# Patient Record
Sex: FEMALE | Race: WHITE | NOT HISPANIC OR LATINO | Employment: FULL TIME | URBAN - METROPOLITAN AREA
[De-identification: names, ages, dates, MRNs, and addresses within clinical notes are randomized per-mention and may not be internally consistent; named-entity substitution may affect disease eponyms.]

---

## 2017-02-26 ENCOUNTER — HOSPITAL ENCOUNTER (EMERGENCY)
Facility: MEDICAL CENTER | Age: 47
End: 2017-02-26
Attending: EMERGENCY MEDICINE
Payer: MEDICAID

## 2017-02-26 VITALS
HEIGHT: 66 IN | DIASTOLIC BLOOD PRESSURE: 87 MMHG | RESPIRATION RATE: 16 BRPM | HEART RATE: 70 BPM | WEIGHT: 231.7 LBS | BODY MASS INDEX: 37.24 KG/M2 | OXYGEN SATURATION: 96 % | TEMPERATURE: 97.4 F | SYSTOLIC BLOOD PRESSURE: 149 MMHG

## 2017-02-26 DIAGNOSIS — Z76.0 MEDICATION REFILL: ICD-10-CM

## 2017-02-26 PROCEDURE — 99283 EMERGENCY DEPT VISIT LOW MDM: CPT

## 2017-02-26 RX ORDER — ATENOLOL 50 MG/1
50 TABLET ORAL DAILY
Qty: 30 TAB | Refills: 0 | Status: SHIPPED | OUTPATIENT
Start: 2017-02-26

## 2017-02-26 RX ORDER — CITALOPRAM 40 MG/1
40 TABLET ORAL DAILY
Qty: 30 TAB | Refills: 0 | Status: SHIPPED | OUTPATIENT
Start: 2017-02-26 | End: 2017-05-22

## 2017-02-26 RX ORDER — LISINOPRIL AND HYDROCHLOROTHIAZIDE 12.5; 1 MG/1; MG/1
1 TABLET ORAL DAILY
Qty: 30 TAB | Refills: 0 | Status: SHIPPED | OUTPATIENT
Start: 2017-02-26

## 2017-02-26 RX ORDER — ONDANSETRON 4 MG/1
4 TABLET, FILM COATED ORAL EVERY 4 HOURS PRN
Qty: 20 TAB | Refills: 0 | Status: SHIPPED | OUTPATIENT
Start: 2017-02-26 | End: 2017-04-02

## 2017-02-26 NOTE — ED AVS SNAPSHOT
2/26/2017          Jaqueline Morocho  [address]  [facility] [address]    Dear Jaqueline:    [facility] wants to ensure your discharge home is safe and you or your loved ones have had all your questions answered regarding your care after you leave the hospital.    You may receive a telephone call within two days of your discharge.  This call is to make certain you understand your discharge instructions as well as ensure we provided you with the best care possible during your stay with us.     The call will only last approximately 3-5 minutes and will be done by a nurse.    Once again, we want to ensure your discharge home is safe and that you have a clear understanding of any next steps in your care.  If you have any questions or concerns, please do not hesitate to contact us, we are here for you.  Thank you for choosing [facility] for your healthcare needs.    Sincerely,    Dion Hutson    [facility]

## 2017-02-26 NOTE — ED AVS SNAPSHOT
[employer] Access Code: Activation code not generated  Current [employer] Status: Active    [email or web address]  A secure, online tool to manage your health information     [employer]’s [employer]® is a secure, online tool that connects you to your personalized health information from the privacy of your home -- day or night - making it very easy for you to manage your healthcare. Once the activation process is completed, you can even access your medical information using the [employer] victoriano, which is available for free in the Apple Victoriano store or Google Play store.     [employer] provides the following levels of access (as shown below):   My Chart Features   [facility] Primary Care Doctor [facility]  Specialists [facility]  Urgent  Care Non-[facility]  Primary Care  Doctor   Email your healthcare team securely and privately 24/7 X X X X   Manage appointments: schedule your next appointment; view details of past/upcoming appointments X      Request prescription refills. X      View recent personal medical records, including lab and immunizations X X X X   View health record, including health history, allergies, medications X X X X   Read reports about your outpatient visits, procedures, consult and ER notes X X X X   See your discharge summary, which is a recap of your hospital and/or ER visit that includes your diagnosis, lab results, and care plan. X X       How to register for [employer]:  1. Go to  [email or web address].  2. Click on the Sign Up Now box, which takes you to the New Member Sign Up page. You will need to provide the following information:  a. Enter your [employer] Access Code exactly as it appears at the top of this page. (You will not need to use this code after you’ve completed the sign-up process. If you do not sign up before the expiration date, you must request a new code.)   b. Enter your date of birth.   c. Enter your home email address.   d. Click Submit, and follow the next screen’s instructions.  3. Create a [employer] ID. This will  be your [employer] login ID and cannot be changed, so think of one that is secure and easy to remember.  4. Create a [employer] password. You can change your password at any time.  5. Enter your Password Reset Question and Answer. This can be used at a later time if you forget your password.   6. Enter your e-mail address. This allows you to receive e-mail notifications when new information is available in [employer].  7. Click Sign Up. You can now view your health information.    For assistance activating your [employer] account, call [phone]

## 2017-02-26 NOTE — ED AVS SNAPSHOT
Home Care Instructions                                                                                                                Jaqueline Morocho   MRN: [id number]    Department:  [facility], Emergency Dept   Date of Visit:  2/26/2017            [facility], Emergency Dept    [address]    [place] [address]    Phone:  [phone]      You were seen by     Wendy Snow M.D.      Your Diagnosis Was     Medication refill     Z76.0       Follow-up Information     1. Follow up with primary care.    Why:  keep appointment to establish care in 2.5 weeks      Medication Information     Review all of your home medications and newly ordered medications with your primary doctor and/or pharmacist as soon as possible. Follow medication instructions as directed by your doctor and/or pharmacist.     Please keep your complete medication list with you and share with your physician. Update the information when medications are discontinued, doses are changed, or new medications (including over-the-counter products) are added; and carry medication information at all times in the event of emergency situations.               Medication List      START taking these medications        Instructions    atenolol 50 MG Tabs   Commonly known as:  TENORMIN    Take 1 Tab by mouth every day.   Dose:  50 mg       citalopram 40 MG Tabs   Commonly known as:  CELEXA    Take 1 Tab by mouth every day.   Dose:  40 mg       lisinopril-hydrochlorothiazide 10-12.5 MG per tablet   Commonly known as:  PRINZIDE, ZESTORETIC    Take 1 Tab by mouth every day.   Dose:  1 Tab       ondansetron 4 MG Tabs tablet   Commonly known as:  ZOFRAN    Take 1 Tab by mouth every four hours as needed for Nausea/Vomiting.   Dose:  4 mg         ASK your doctor about these medications        Instructions    amitriptyline 25 MG Tabs   Commonly known as:  ELAVIL    Take 1 Tab by mouth at bedtime as needed.   Dose:  25 mg       carisoprodol 350 MG Tabs   Commonly known as:  SOMA    Take 1 Tab by mouth every 8 hours as needed (pain). No driving, no drinking alcohol   Dose:  350 mg       * cyclobenzaprine 10 MG Tabs   Commonly known as:  FLEXERIL    Take 1 Tab by mouth 3 times a day as needed.   Dose:  10 mg       * cyclobenzaprine 10 MG Tabs   Commonly known as:  FLEXERIL    Take 1 Tab by mouth 3 times a day as needed.   Dose:  10 mg       diclofenac EC 75 MG Tbec   Commonly known as:  VOLTAREN    Take 1 Tab by mouth 2 times a day.   Dose:  75 mg       ibuprofen 200 MG Tabs   Commonly known as:  MOTRIN    Take 200 mg by mouth every 6 hours as needed.   Dose:  200 mg       MethylPREDNISolone 4 MG Tbpk   Commonly known as:  MEDROL DOSEPAK    As directed       oxycodone-acetaminophen 5-325 MG Tabs   Commonly known as:  PERCOCET    Take 1-2 Tabs by mouth every four hours as needed.   Dose:  1-2 Tab       * Notice:  This list has 2 medication(s) that are the same as other medications prescribed for you. Read the directions carefully, and ask your doctor or other care provider to review them with you.              Discharge Instructions       Medicine Refill at the Emergency Department  We have refilled your medicine today, but it is best for you to get refills through your primary health care provider's office. In the future, please plan ahead so you do not need to get refills from the emergency department.  If the medicine we refilled was a maintenance medicine, you may have received only enough to get you by until you are able to see your regular health care provider.     This information is not intended to replace advice given to you by your health care provider. Make sure you discuss any questions you have with your health care provider.     Document Released: 04/05/2005 Document Revised: 01/08/2016 Document Reviewed: 03/27/2015  [employer] Interactive Patient Education ©2016 Elsevier Inc.            Patient Information     Patient  Information    Following emergency treatment: all patient requiring follow-up care must return either to a private physician or a clinic if your condition worsens before you are able to obtain further medical attention, please return to the emergency room.     Billing Information    At [facility], we work to make the billing process streamlined for our patients.  Our Representatives are here to answer any questions you may have regarding your hospital bill.  If you have insurance coverage and have supplied your insurance information to us, we will submit a claim to your insurer on your behalf.  Should you have any questions regarding your bill, we can be reached online or by phone as follows:  Online: You are able pay your bills online or live chat with our representatives about any billing questions you may have. We are here to help Monday - Friday from 8:00am to 7:30pm and 9:00am - 12:00pm on Saturdays.  Please visit [email or web address]  for more information.   Phone:  [phone] or [phone]    Please note that your emergency physician, surgeon, pathologist, radiologist, anesthesiologist, and other specialists are not employed by [facility] and will therefore bill separately for their services.  Please contact them directly for any questions concerning their bills at the numbers below:     Emergency Physician Services:  [phone]  [facility]:  [phone]  [facility]:  [phone]  [facility]:  [phone]    1. Your final bill may vary from the amount quoted upon discharge if all procedures are not complete at that time, or if your doctor has additional procedures of which we are not aware. You will receive an additional bill if you return to the Emergency Department at [facility] for suture removal regardless of the facility of which the sutures were placed.     2. Please arrange for settlement of this account  at the emergency registration.    3. All self-pay accounts are due in full at the time of treatment.  If you are unable to meet this obligation then payment is expected within 4-5 days.     4. If you have had radiology studies (CT, X-ray, Ultrasound, MRI), you have received a preliminary result during your emergency department visit. Please contact the radiology department [phone] to receive a copy of your final result. Please discuss the Final result with your primary physician or with the follow up physician provided.     Crisis Hotline:  [place] Crisis Hotline:  9-287-LVVNOXX or 1-657.296.1253  [place] Crisis Hotline:    1-454.494.6421 or 142-742-1953         ED Discharge Follow Up Questions    1. In order to provide you with very good care, we would like to follow up with a phone call in the next few days.  May we have your permission to contact you?     YES /  NO    2. What is the best phone number to call you? (       )_____-__________    3. What is the best time to call you?      Morning  /  Afternoon  /  Evening                   Patient Signature:  ____________________________________________________________    Date:  ____________________________________________________________

## 2017-02-27 NOTE — DISCHARGE INSTRUCTIONS
Medicine Refill at the Emergency Department  We have refilled your medicine today, but it is best for you to get refills through your primary health care provider's office. In the future, please plan ahead so you do not need to get refills from the emergency department.  If the medicine we refilled was a maintenance medicine, you may have received only enough to get you by until you are able to see your regular health care provider.     This information is not intended to replace advice given to you by your health care provider. Make sure you discuss any questions you have with your health care provider.     Document Released: 04/05/2005 Document Revised: 01/08/2016 Document Reviewed: 03/27/2015  Elsevier Interactive Patient Education ©2016 Elsevier Inc.

## 2017-02-27 NOTE — ED NOTES
Pt comes in asking for medication refill on celexa, atenolol 50mg, and lisinopril/HCTZ. Pt states she has an appt in 2.5 weeks but has been out of her meds for 1 week.

## 2017-02-27 NOTE — ED NOTES
Pt given d/c instructions/prescription/home care instructions, given 4 Rx, pt verbalized understanding of POC, pt ambulated to ER lobby, steady gait.

## 2017-02-27 NOTE — ED PROVIDER NOTES
"ED Provider Note    Scribed for Wendy Snow M.D. by Usha Tomlin. 2/26/2017  6:55 PM    Primary care provider: Pcp Pt States None  Means of arrival: walk-in  History obtained from: patient  History limited by: none    CHIEF COMPLAINT  Chief Complaint   Patient presents with   • Medication Refill       HPI  Jaqueline Morocho is a 46 y.o. female who presents to the Emergency Department for medication refills. Patient has been out of her daily medications 50mg Atenolol once a day, 12.5 Lisinopril/HCTZ once a day, and Celeza for a few weeks now. She has an appointment to follow up with her Primary Care in 2.5 weeks, however was unable to get in any sooner appointment. Her only complaint at this time is nausea and vomiting which she requests Zofran for. No complaints of fever, sore throat, cough, or congestion.    REVIEW OF SYSTEMS  HEENT:  No, congestion, or sore throat    PULMONARY: no cough, or congestion   GI: positive nausea and vomiting  : no dysuria, back pain, or hematuria   Endocrine: no fevers     PAST MEDICAL HISTORY   has a past medical history of Psychiatric disorder and Hypertension.    SURGICAL HISTORY  patient denies any surgical history    SOCIAL HISTORY  Social History   Substance Use Topics   • Smoking status: Never Smoker    • Smokeless tobacco: None   • Alcohol Use: No      History   Drug Use No       FAMILY HISTORY  History reviewed. No pertinent family history.    CURRENT MEDICATIONS  Home Medications     **Home medications have not yet been reviewed for this encounter**          ALLERGIES  Allergies   Allergen Reactions   • Augmentin    • Bactrim [Sulfamethoxazole W-Trimethoprim]    • Cephalosporins    • Demerol    • Fentanyl    • Morphine    • Sulfa Drugs        PHYSICAL EXAM  VITAL SIGNS: /87 mmHg  Pulse 70  Temp(Src) 36.3 °C (97.4 °F) (Temporal)  Resp 16  Ht 1.676 m (5' 5.98\")  Wt 105.1 kg (231 lb 11.3 oz)  BMI 37.42 kg/m2  SpO2 96%    Constitutional: Well developed, Well " nourished, No acute distress, Non-toxic appearance.   HEENT: Normocephalic, Atraumatic,  external ears normal,   Eyes: PERRL, EOMI, Conjunctiva normal, No discharge.   Neck: Normal range of motion,.     Cardiovascular: Regular Rate and Rhythm, No murmurs,  rubs, or gallops.   Thorax & Lungs: Lungs clear to auscultation bilaterally, No respiratory distress, No wheezes, rhales or rhonchi, No chest wall tenderness.   Skin: Warm, Dry, No erythema, No rash,   Neurologic: Alert & oriented x 3, Normal motor function, Normal sensory function, No focal deficits noted. Normal reflexes. Normal Cranial Nerves.   Musculoskeletal: Good range of motion in all major joints. No major deformities noted.       COURSE & MEDICAL DECISION MAKING  Nursing notes, VS, PMSFHx reviewed in chart.    6:55 PM - Patient seen and examined at bedside. I informed the patient that I would be able to refill her prescriptions for a month and to keep her PCP appointment. She will be discharged    I reviewed prescription monitoring program for patient's narcotic use before prescribing a scheduled drug.The patient will not drink alcohol nor drive with prescribed medications. The patient will return for new or worsening symptoms and is stable at the time of discharge.      DISPOSITION:  Patient will be discharged home in stable condition.    FOLLOW UP:  primary care      keep appointment to establish care in 2.5 weeks      OUTPATIENT MEDICATIONS:  Discharge Medication List as of 2/26/2017  7:03 PM      START taking these medications    Details   citalopram (CELEXA) 40 MG Tab Take 1 Tab by mouth every day., Disp-30 Tab, R-0, Print Rx Paper      atenolol (TENORMIN) 50 MG Tab Take 1 Tab by mouth every day., Disp-30 Tab, R-0, Print Rx Paper      lisinopril-hydrochlorothiazide (PRINZIDE, ZESTORETIC) 10-12.5 MG per tablet Take 1 Tab by mouth every day., Disp-30 Tab, R-0, Print Rx Paper      ondansetron (ZOFRAN) 4 MG Tab tablet 4 mg, EVERY 4 HOURS PRN Starting  2/26/2017, Until Discontinued, Disp-20 Tab, R-0, Oral                 FINAL IMPRESSION  1. Medication refill          I, Usha Tomlin (Scribe), am scribing for, and in the presence of, Wendy Snow M.D..    Electronically signed by: Usha Tomlin (Scribe), 2/26/2017    IWendy M.D. personally performed the services described in this documentation, as scribed by Usha Tomlin in my presence, and it is both accurate and complete.    The note accurately reflects work and decisions made by me.  Wendy Snow  2/26/2017  7:55 PM

## 2017-04-02 ENCOUNTER — APPOINTMENT (OUTPATIENT)
Dept: RADIOLOGY | Facility: MEDICAL CENTER | Age: 47
End: 2017-04-02
Attending: EMERGENCY MEDICINE
Payer: MEDICAID

## 2017-04-02 ENCOUNTER — HOSPITAL ENCOUNTER (EMERGENCY)
Facility: MEDICAL CENTER | Age: 47
End: 2017-04-02
Attending: EMERGENCY MEDICINE
Payer: MEDICAID

## 2017-04-02 VITALS
DIASTOLIC BLOOD PRESSURE: 93 MMHG | HEIGHT: 66 IN | RESPIRATION RATE: 14 BRPM | BODY MASS INDEX: 33.75 KG/M2 | HEART RATE: 65 BPM | OXYGEN SATURATION: 98 % | TEMPERATURE: 97 F | SYSTOLIC BLOOD PRESSURE: 133 MMHG | WEIGHT: 210 LBS

## 2017-04-02 DIAGNOSIS — M25.531 WRIST PAIN, RIGHT: ICD-10-CM

## 2017-04-02 DIAGNOSIS — Z76.0 MEDICATION REFILL: ICD-10-CM

## 2017-04-02 PROCEDURE — 73110 X-RAY EXAM OF WRIST: CPT | Mod: RT

## 2017-04-02 PROCEDURE — 29125 APPL SHORT ARM SPLINT STATIC: CPT

## 2017-04-02 PROCEDURE — 302874 HCHG BANDAGE ACE 2 OR 3""

## 2017-04-02 PROCEDURE — 99284 EMERGENCY DEPT VISIT MOD MDM: CPT

## 2017-04-02 PROCEDURE — 700102 HCHG RX REV CODE 250 W/ 637 OVERRIDE(OP): Performed by: EMERGENCY MEDICINE

## 2017-04-02 PROCEDURE — A9270 NON-COVERED ITEM OR SERVICE: HCPCS | Performed by: EMERGENCY MEDICINE

## 2017-04-02 RX ORDER — HYDROCODONE BITARTRATE AND ACETAMINOPHEN 5; 325 MG/1; MG/1
1-2 TABLET ORAL EVERY 6 HOURS PRN
Qty: 10 TAB | Refills: 0 | Status: SHIPPED | OUTPATIENT
Start: 2017-04-02 | End: 2017-08-10

## 2017-04-02 RX ORDER — ATENOLOL 50 MG/1
50 TABLET ORAL DAILY
Qty: 30 TAB | Refills: 0 | Status: SHIPPED | OUTPATIENT
Start: 2017-04-02

## 2017-04-02 RX ORDER — HYDROCODONE BITARTRATE AND ACETAMINOPHEN 5; 325 MG/1; MG/1
1 TABLET ORAL ONCE
Status: COMPLETED | OUTPATIENT
Start: 2017-04-02 | End: 2017-04-02

## 2017-04-02 RX ORDER — LISINOPRIL AND HYDROCHLOROTHIAZIDE 12.5; 1 MG/1; MG/1
1 TABLET ORAL DAILY
Qty: 30 TAB | Refills: 0 | Status: SHIPPED | OUTPATIENT
Start: 2017-04-02 | End: 2017-05-22

## 2017-04-02 RX ORDER — CITALOPRAM 40 MG/1
40 TABLET ORAL DAILY
Qty: 30 TAB | Refills: 0 | Status: SHIPPED | OUTPATIENT
Start: 2017-04-02

## 2017-04-02 RX ADMIN — HYDROCODONE BITARTRATE AND ACETAMINOPHEN 1 TABLET: 5; 325 TABLET ORAL at 17:09

## 2017-04-02 ASSESSMENT — PAIN SCALES - GENERAL
PAINLEVEL_OUTOF10: 6
PAINLEVEL_OUTOF10: 7
PAINLEVEL_OUTOF10: 5

## 2017-04-02 NOTE — ED AVS SNAPSHOT
Home Care Instructions                                                                                                                Jaqueline Morocho   MRN: [id number]    Department:  [facility], Emergency Dept   Date of Visit:  4/2/2017            [facility], Emergency Dept    [address]    [place] [address]    Phone:  [phone]      You were seen by     Vinicius Carlton M.D.      Your Diagnosis Was     Wrist pain, right     M25.531       These are the medications you received during your hospitalization from 04/02/2017 1503 to 04/02/2017 1754     Date/Time Order Dose Route Action    04/02/2017 1709 hydrocodone-acetaminophen (NORCO) 5-325 MG per tablet 1 Tab 1 Tab Oral Given      Follow-up Information     1. Follow up with [facility].    Why:  as scheduled    Contact information    [place] [address] [phone]          2. Follow up with Stewart Pena M.D.. Call in 1 day.    Specialty:  Family Medicine    Contact information    [facility] [id number]  [place] [address] [phone]        Medication Information     Review all of your home medications and newly ordered medications with your primary doctor and/or pharmacist as soon as possible. Follow medication instructions as directed by your doctor and/or pharmacist.     Please keep your complete medication list with you and share with your physician. Update the information when medications are discontinued, doses are changed, or new medications (including over-the-counter products) are added; and carry medication information at all times in the event of emergency situations.               Medication List      START taking these medications        Instructions    Morning Afternoon Evening Bedtime    hydrocodone-acetaminophen 5-325 MG Tabs per tablet   Last time this was given:  1 Tab on 4/2/2017  5:09 PM   Commonly known as:  NORCO        Take 1-2 Tabs by mouth every 6 hours as needed.   Dose:  1-2 Tab                         ASK your doctor about these medications        Instructions    Morning Afternoon Evening Bedtime    * atenolol 50 MG Tabs   What changed:  Another medication with the same name was added. Make sure you understand how and when to take each.   Commonly known as:  TENORMIN   Ask about: Which instructions should I use?        Take 1 Tab by mouth every day.   Dose:  50 mg                        * atenolol 50 MG Tabs   What changed:  You were already taking a medication with the same name, and this prescription was added. Make sure you understand how and when to take each.   Commonly known as:  TENORMIN   Ask about: Which instructions should I use?        Take 1 Tab by mouth every day.   Dose:  50 mg                        * citalopram 40 MG Tabs   What changed:  Another medication with the same name was added. Make sure you understand how and when to take each.   Commonly known as:  CELEXA   Ask about: Which instructions should I use?        Take 1 Tab by mouth every day.   Dose:  40 mg                        * citalopram 40 MG Tabs   What changed:  You were already taking a medication with the same name, and this prescription was added. Make sure you understand how and when to take each.   Commonly known as:  CELEXA   Ask about: Which instructions should I use?        Take 1 Tab by mouth every day.   Dose:  40 mg                        ibuprofen 200 MG Tabs   Commonly known as:  MOTRIN        Take 200 mg by mouth every 6 hours as needed.   Dose:  200 mg                        * lisinopril-hydrochlorothiazide 10-12.5 MG per tablet   What changed:  Another medication with the same name was added. Make sure you understand how and when to take each.   Commonly known as:  LEÓN ERIC   Ask about: Which instructions should I use?        Take 1 Tab by mouth every day.   Dose:  1 Tab                        * lisinopril-hydrochlorothiazide 10-12.5 MG per tablet   What changed:  You were already taking a  medication with the same name, and this prescription was added. Make sure you understand how and when to take each.   Commonly known as:  LEÓN ERIC   Ask about: Which instructions should I use?        Take 1 Tab by mouth every day.   Dose:  1 Tab                        * Notice:  This list has 6 medication(s) that are the same as other medications prescribed for you. Read the directions carefully, and ask your doctor or other care provider to review them with you.         Where to Get Your Medications      You can get these medications from any pharmacy     Bring a paper prescription for each of these medications    - atenolol 50 MG Tabs  - citalopram 40 MG Tabs  - hydrocodone-acetaminophen 5-325 MG Tabs per tablet  - lisinopril-hydrochlorothiazide 10-12.5 MG per tablet            Procedures and tests performed during your visit     DX-WRIST-COMPLETE 3+ RIGHT    SPLINT APPLICATION        Discharge Instructions       Wrist Pain  Wrist injuries are frequent in adults and children. A sprain is an injury to the ligaments that hold your bones together. A strain is an injury to muscle or muscle cord-like structures (tendons) from stretching or pulling. Generally, when wrists are moderately tender to touch following a fall or injury, a break in the bone (fracture) may be present. Most wrist sprains or strains are better in 3 to 5 days, but complete healing may take several weeks.  HOME CARE INSTRUCTIONS   · Put ice on the injured area.  ¨ Put ice in a plastic bag.  ¨ Place a towel between your skin and the bag.  ¨ Leave the ice on for 15-20 minutes, 3-4 times a day, for the first 2 days, or as directed by your health care provider.  · Keep your arm raised above the level of your heart whenever possible to reduce swelling and pain.  · Rest the injured area for at least 48 hours or as directed by your health care provider.  · If a splint or elastic bandage has been applied, use it for as long as directed by your  health care provider or until seen by a health care provider for a follow-up exam.  · Only take over-the-counter or prescription medicines for pain, discomfort, or fever as directed by your health care provider.  · Keep all follow-up appointments. You may need to follow up with a specialist or have follow-up X-rays. Improvement in pain level is not a guarantee that you did not fracture a bone in your wrist. The only way to determine whether or not you have a broken bone is by X-ray.  SEEK IMMEDIATE MEDICAL CARE IF:   · Your fingers are swollen, very red, white, or cold and blue.  · Your fingers are numb or tingling.  · You have increasing pain.  · You have difficulty moving your fingers.  MAKE SURE YOU:   · Understand these instructions.  · Will watch your condition.  · Will get help right away if you are not doing well or get worse.     This information is not intended to replace advice given to you by your health care provider. Make sure you discuss any questions you have with your health care provider.     Document Released: 09/27/2006 Document Revised: 12/23/2014 Document Reviewed: 02/08/2012  [employer] Interactive Patient Education ©2016 [employer]                  Patient Information     Patient Information    Following emergency treatment: all patient requiring follow-up care must return either to a private physician or a clinic if your condition worsens before you are able to obtain further medical attention, please return to the emergency room.     Billing Information    [facility] we work to make the billing process streamlined for our patients.  Our Representatives are here to answer any questions you may have regarding your hospital bill.  If you have insurance coverage and have supplied your insurance information to us, we will submit a claim to your insurer on your behalf.  Should you have any questions regarding your bill, we can be reached online or by phone as follows:  Online: You are able  pay your bills online or live chat with our representatives about any billing questions you may have. We are here to help Monday - Friday from 8:00am to 7:30pm and 9:00am - 12:00pm on Saturdays.  Please visit [email or web address]  for more information.   Phone:  [phone] or [phone]    Please note that your emergency physician, surgeon, pathologist, radiologist, anesthesiologist, and other specialists are not employed by [facility] and will therefore bill separately for their services.  Please contact them directly for any questions concerning their bills at the numbers below:     Emergency Physician Services:  [phone]  [facility]:  [phone]  [facility]:  [phone]  [facility]:  [phone]    1. Your final bill may vary from the amount quoted upon discharge if all procedures are not complete at that time, or if your doctor has additional procedures of which we are not aware. You will receive an additional bill if you return to the Emergency Department at [facility] for suture removal regardless of the facility of which the sutures were placed.     2. Please arrange for settlement of this account at the emergency registration.    3. All self-pay accounts are due in full at the time of treatment.  If you are unable to meet this obligation then payment is expected within 4-5 days.     4. If you have had radiology studies (CT, X-ray, Ultrasound, MRI), you have received a preliminary result during your emergency department visit. Please contact the radiology department [phone] to receive a copy of your final result. Please discuss the Final result with your primary physician or with the follow up physician provided.     Crisis Hotline:  [place] Crisis Hotline:  1-145-VERHTMP or 1-909.678.4930  [place] Crisis Hotline:    1-116.628.5927 or 766-483-5037         ED Discharge Follow Up Questions    1. In order to  provide you with very good care, we would like to follow up with a phone call in the next few days.  May we have your permission to contact you?     YES /  NO    2. What is the best phone number to call you? (       )_____-__________    3. What is the best time to call you?      Morning  /  Afternoon  /  Evening                   Patient Signature:  ____________________________________________________________    Date:  ____________________________________________________________

## 2017-04-02 NOTE — ED AVS SNAPSHOT
4/2/2017          Jaqueline Morocho  [address] [place] [id number]    Dear Jaqueline:    [facility] wants to ensure your discharge home is safe and you or your loved ones have had all your questions answered regarding your care after you leave the hospital.    You may receive a telephone call within two days of your discharge.  This call is to make certain you understand your discharge instructions as well as ensure we provided you with the best care possible during your stay with us.     The call will only last approximately 3-5 minutes and will be done by a nurse.    Once again, we want to ensure your discharge home is safe and that you have a clear understanding of any next steps in your care.  If you have any questions or concerns, please do not hesitate to contact us, we are here for you.  Thank you for choosing [facility] for your healthcare needs.    Sincerely,    Dion Hutson    [facility]

## 2017-04-02 NOTE — ED NOTES
Pt also states she needs a refill of her medications as she is unable to get into her pmd until may

## 2017-04-03 NOTE — DISCHARGE INSTRUCTIONS
Wrist Pain  Wrist injuries are frequent in adults and children. A sprain is an injury to the ligaments that hold your bones together. A strain is an injury to muscle or muscle cord-like structures (tendons) from stretching or pulling. Generally, when wrists are moderately tender to touch following a fall or injury, a break in the bone (fracture) may be present. Most wrist sprains or strains are better in 3 to 5 days, but complete healing may take several weeks.  HOME CARE INSTRUCTIONS   · Put ice on the injured area.  ¨ Put ice in a plastic bag.  ¨ Place a towel between your skin and the bag.  ¨ Leave the ice on for 15-20 minutes, 3-4 times a day, for the first 2 days, or as directed by your health care provider.  · Keep your arm raised above the level of your heart whenever possible to reduce swelling and pain.  · Rest the injured area for at least 48 hours or as directed by your health care provider.  · If a splint or elastic bandage has been applied, use it for as long as directed by your health care provider or until seen by a health care provider for a follow-up exam.  · Only take over-the-counter or prescription medicines for pain, discomfort, or fever as directed by your health care provider.  · Keep all follow-up appointments. You may need to follow up with a specialist or have follow-up X-rays. Improvement in pain level is not a guarantee that you did not fracture a bone in your wrist. The only way to determine whether or not you have a broken bone is by X-ray.  SEEK IMMEDIATE MEDICAL CARE IF:   · Your fingers are swollen, very red, white, or cold and blue.  · Your fingers are numb or tingling.  · You have increasing pain.  · You have difficulty moving your fingers.  MAKE SURE YOU:   · Understand these instructions.  · Will watch your condition.  · Will get help right away if you are not doing well or get worse.     This information is not intended to replace advice given to you by your health care  provider. Make sure you discuss any questions you have with your health care provider.     Document Released: 09/27/2006 Document Revised: 12/23/2014 Document Reviewed: 02/08/2012  [employer]vier Interactive Patient Education ©2016 [employer]

## 2017-04-03 NOTE — ED NOTES
Discharge instructions given to pt. Pt verbalized instructions and questions answered. 4 prescriptions given. Pt ambulatory to home with fiance.

## 2017-04-03 NOTE — ED PROVIDER NOTES
ED Provider Note    CHIEF COMPLAINT  Chief Complaint   Patient presents with   • Wrist Pain     right wrist injury 7 days ago. Has been seen at [facility]. C/o of increasing pain. Does sates swelling has decreased.    • Medication Refill     unable to get into pmd until may       [facility]  Jaqueline Morocho is a 46 y.o. female who presents with complaints of right wrist pain after fall 1 week ago. The patient says she fell going up some steps and landed on outstretched right hand and left elbow.  She was seen at [facility], and that she have x-rays which showed no fracture. He doesn't follow-up with orthopedics scheduled on April 10. She says she continues to have pain to the right wrist, worse when she tries to end her thumb backwards.  She also has pain with flexion and extension of the wrist. She denies any numbness or tingling to the hand. She denies any other injuries. She did not hit her head, no loss conscious. She denies any headache, neck pain, chest pain, back pain, or abdominal pain. She denies any significant pain to her left elbow. She is also asking for refill of her medications, says she has been out of them for about 1 week.    REVIEW OF SYSTEMS  See HPI for further details.      PAST MEDICAL HISTORY  Past Medical History   Diagnosis Date   • Psychiatric disorder      anxiety   • Hypertension        FAMILY HISTORY  No family history on file.    SOCIAL HISTORY  Social History     Social History   • Marital Status: Single     Spouse Name: N/A   • Number of Children: N/A   • Years of Education: N/A     Social History Main Topics   • Smoking status: Never Smoker    • Smokeless tobacco: None   • Alcohol Use: No   • Drug Use: No   • Sexual Activity: Not Asked     Other Topics Concern   • None     Social History Narrative       SURGICAL HISTORY  History reviewed. No pertinent past surgical history.    CURRENT MEDICATIONS  Home Medications     Reviewed by Chanel Whaley R.N. (Registered Nurse) on  "04/02/17 at 1543  Med List Status: Partial    Medication Last Dose Status    atenolol (TENORMIN) 50 MG Tab 4/2/2017 Active    citalopram (CELEXA) 40 MG Tab 4/2/2017 Active    ibuprofen (MOTRIN) 200 MG Tab 4/2/2017 Active    lisinopril-hydrochlorothiazide (PRINZIDE, ZESTORETIC) 10-12.5 MG per tablet 4/2/2017 Active                ALLERGIES  Allergies   Allergen Reactions   • Augmentin    • Bactrim [Sulfamethoxazole W-Trimethoprim]    • Cephalosporins    • Demerol    • Fentanyl    • Morphine    • Sulfa Drugs        PHYSICAL EXAM  VITAL SIGNS: /74 mmHg  Pulse 83  Temp(Src) 36.1 °C (97 °F)  Resp 16  Ht 1.676 m (5' 6\")  Wt 95.255 kg (210 lb)  BMI 33.91 kg/m2  SpO2 98%  Constitutional: Awake, alert, in no acute distress, Non-toxic appearance.   HENT: Atraumatic. Bilateral external ears normal, mucous membranes moist, throat nonerythematous without exudates, nose is normal.  Eyes: PERRL, EOMI, conjunctiva moist, noninjected.  Neck: Nontender, Normal range of motion, No nuchal rigidity, No stridor.   Lymphatic: No lymphadenopathy noted.   Cardiovascular: Regular rate and rhythm, no murmurs, rubs, gallops.  Thorax & Lungs:  Good breath sounds bilaterally, no wheezes, rales, or retractions.  No chest tenderness.  Abdomen: Bowel sounds normal, Soft, nontender, nondistended, no rebound, guarding, masses.  Back: No CVA or spinal tenderness.  Extremities: Intact distal pulses, No edema, there is tenderness over the dorsum and volar surface of the right wrist. There is tenderness over the anatomical snuffbox. There is increased pain with hyperextension of the left thumb, it radiates into her wrist and forearm. There is mild tenderness to the metacarpophalangeal joint of the thumb. No tenderness to the rest of the hand or fingers. There is good sensation and capillary refill to the fingers. There is a good radial pulse. There is no focal tenderness to the middle proximal forearm, elbow, or shoulder.  Skin: Warm, Dry, " No rashes.   Musculoskeletal: There is tenderness to the right wrist as noted above. No other joint swelling or tenderness.  Neurologic: Alert & oriented x 3, sensory and motor function normal. No focal deficits.   Psychiatric: Affect normal, Judgment normal, Mood normal.         RADIOLOGY/PROCEDURES  DX-WRIST-COMPLETE 3+ RIGHT   Final Result      No radiographic evidence of acute or subacute traumatic injury.            COURSE & MEDICAL DECISION MAKING  Pertinent Labs & Imaging studies reviewed. (See chart for details)  The patient presents with the above complaints. She is given a Norco for pain. X-rays of the right wrist was negative for fracture. Given that she has times over the anatomical snuffbox, she was placed in a thumb spica splint. Patient was given refills for her Celexa, atenolol, and lisinopril/HCTZ. She is also given Norco 5/325 #10. The patient is to return to the ER for worsening pain, redness, swelling, or any other problems. She is to follow-up with orthopedics as scheduled. She is to follow-up with her PCP and call the office tomorrow.    FINAL IMPRESSION  1. Right wrist injury  2. Medication refill  3.         Electronically signed by: Vinicius Carlton, 4/2/2017 5:33 PM

## 2017-04-10 ENCOUNTER — APPOINTMENT (OUTPATIENT)
Dept: RADIOLOGY | Facility: MEDICAL CENTER | Age: 47
End: 2017-04-10
Attending: EMERGENCY MEDICINE
Payer: MEDICAID

## 2017-04-10 ENCOUNTER — HOSPITAL ENCOUNTER (EMERGENCY)
Facility: MEDICAL CENTER | Age: 47
End: 2017-04-11
Attending: EMERGENCY MEDICINE
Payer: MEDICAID

## 2017-04-10 DIAGNOSIS — R11.2 NON-INTRACTABLE VOMITING WITH NAUSEA, UNSPECIFIED VOMITING TYPE: ICD-10-CM

## 2017-04-10 DIAGNOSIS — R10.84 GENERALIZED ABDOMINAL PAIN: ICD-10-CM

## 2017-04-10 LAB
ALBUMIN SERPL BCP-MCNC: 4.4 G/DL (ref 3.2–4.9)
ALBUMIN/GLOB SERPL: 1.4 G/DL
ALP SERPL-CCNC: 111 U/L (ref 30–99)
ALT SERPL-CCNC: 65 U/L (ref 2–50)
ANION GAP SERPL CALC-SCNC: 6 MMOL/L (ref 0–11.9)
APPEARANCE UR: CLEAR
AST SERPL-CCNC: 48 U/L (ref 12–45)
BASOPHILS # BLD AUTO: 0.5 % (ref 0–1.8)
BASOPHILS # BLD: 0.04 K/UL (ref 0–0.12)
BILIRUB SERPL-MCNC: 0.5 MG/DL (ref 0.1–1.5)
BUN SERPL-MCNC: 24 MG/DL (ref 8–22)
CALCIUM SERPL-MCNC: 9.4 MG/DL (ref 8.5–10.5)
CHLORIDE SERPL-SCNC: 102 MMOL/L (ref 96–112)
CO2 SERPL-SCNC: 25 MMOL/L (ref 20–33)
COLOR UR AUTO: YELLOW
CREAT SERPL-MCNC: 0.82 MG/DL (ref 0.5–1.4)
EOSINOPHIL # BLD AUTO: 0.2 K/UL (ref 0–0.51)
EOSINOPHIL NFR BLD: 2.7 % (ref 0–6.9)
ERYTHROCYTE [DISTWIDTH] IN BLOOD BY AUTOMATED COUNT: 46.5 FL (ref 35.9–50)
GFR SERPL CREATININE-BSD FRML MDRD: >60 ML/MIN/1.73 M 2
GLOBULIN SER CALC-MCNC: 3.1 G/DL (ref 1.9–3.5)
GLUCOSE SERPL-MCNC: 119 MG/DL (ref 65–99)
GLUCOSE UR QL STRIP.AUTO: NEGATIVE MG/DL
HCG UR QL: NEGATIVE
HCT VFR BLD AUTO: 44.4 % (ref 37–47)
HGB BLD-MCNC: 14.8 G/DL (ref 12–16)
IMM GRANULOCYTES # BLD AUTO: 0.03 K/UL (ref 0–0.11)
IMM GRANULOCYTES NFR BLD AUTO: 0.4 % (ref 0–0.9)
KETONES UR QL STRIP.AUTO: NEGATIVE MG/DL
LEUKOCYTE ESTERASE UR QL STRIP.AUTO: NEGATIVE
LIPASE SERPL-CCNC: 33 U/L (ref 11–82)
LYMPHOCYTES # BLD AUTO: 1.61 K/UL (ref 1–4.8)
LYMPHOCYTES NFR BLD: 21.5 % (ref 22–41)
MCH RBC QN AUTO: 33.2 PG (ref 27–33)
MCHC RBC AUTO-ENTMCNC: 33.3 G/DL (ref 33.6–35)
MCV RBC AUTO: 99.6 FL (ref 81.4–97.8)
MONOCYTES # BLD AUTO: 0.59 K/UL (ref 0–0.85)
MONOCYTES NFR BLD AUTO: 7.9 % (ref 0–13.4)
NEUTROPHILS # BLD AUTO: 5.01 K/UL (ref 2–7.15)
NEUTROPHILS NFR BLD: 67 % (ref 44–72)
NITRITE UR QL STRIP.AUTO: NEGATIVE
NRBC # BLD AUTO: 0 K/UL
NRBC BLD AUTO-RTO: 0 /100 WBC
PH UR STRIP.AUTO: 5 [PH]
PLATELET # BLD AUTO: 276 K/UL (ref 164–446)
PMV BLD AUTO: 10.1 FL (ref 9–12.9)
POTASSIUM SERPL-SCNC: 4.8 MMOL/L (ref 3.6–5.5)
PROT SERPL-MCNC: 7.5 G/DL (ref 6–8.2)
PROT UR QL STRIP: NEGATIVE MG/DL
RBC # BLD AUTO: 4.46 M/UL (ref 4.2–5.4)
RBC UR QL AUTO: ABNORMAL
SODIUM SERPL-SCNC: 133 MMOL/L (ref 135–145)
SP GR UR: 1.02
WBC # BLD AUTO: 7.5 K/UL (ref 4.8–10.8)

## 2017-04-10 PROCEDURE — 96375 TX/PRO/DX INJ NEW DRUG ADDON: CPT

## 2017-04-10 PROCEDURE — 85025 COMPLETE CBC W/AUTO DIFF WBC: CPT

## 2017-04-10 PROCEDURE — 700111 HCHG RX REV CODE 636 W/ 250 OVERRIDE (IP): Performed by: EMERGENCY MEDICINE

## 2017-04-10 PROCEDURE — 81025 URINE PREGNANCY TEST: CPT

## 2017-04-10 PROCEDURE — 81002 URINALYSIS NONAUTO W/O SCOPE: CPT

## 2017-04-10 PROCEDURE — 99285 EMERGENCY DEPT VISIT HI MDM: CPT

## 2017-04-10 PROCEDURE — 96374 THER/PROPH/DIAG INJ IV PUSH: CPT

## 2017-04-10 PROCEDURE — 700105 HCHG RX REV CODE 258: Performed by: EMERGENCY MEDICINE

## 2017-04-10 PROCEDURE — 96361 HYDRATE IV INFUSION ADD-ON: CPT

## 2017-04-10 PROCEDURE — 700117 HCHG RX CONTRAST REV CODE 255: Performed by: EMERGENCY MEDICINE

## 2017-04-10 PROCEDURE — 83690 ASSAY OF LIPASE: CPT

## 2017-04-10 PROCEDURE — 80053 COMPREHEN METABOLIC PANEL: CPT

## 2017-04-10 PROCEDURE — 36415 COLL VENOUS BLD VENIPUNCTURE: CPT

## 2017-04-10 PROCEDURE — 74177 CT ABD & PELVIS W/CONTRAST: CPT

## 2017-04-10 RX ORDER — ONDANSETRON 2 MG/ML
4 INJECTION INTRAMUSCULAR; INTRAVENOUS ONCE
Status: COMPLETED | OUTPATIENT
Start: 2017-04-10 | End: 2017-04-10

## 2017-04-10 RX ORDER — SODIUM CHLORIDE 9 MG/ML
1000 INJECTION, SOLUTION INTRAVENOUS ONCE
Status: COMPLETED | OUTPATIENT
Start: 2017-04-10 | End: 2017-04-10

## 2017-04-10 RX ADMIN — IOHEXOL 50 ML: 240 INJECTION, SOLUTION INTRATHECAL; INTRAVASCULAR; INTRAVENOUS; ORAL at 23:04

## 2017-04-10 RX ADMIN — SODIUM CHLORIDE 1000 ML: 9 INJECTION, SOLUTION INTRAVENOUS at 22:21

## 2017-04-10 RX ADMIN — ONDANSETRON 4 MG: 2 INJECTION INTRAMUSCULAR; INTRAVENOUS at 22:20

## 2017-04-10 RX ADMIN — IOHEXOL 100 ML: 350 INJECTION, SOLUTION INTRAVENOUS at 23:02

## 2017-04-10 RX ADMIN — HYDROMORPHONE HYDROCHLORIDE 0.5 MG: 1 INJECTION, SOLUTION INTRAMUSCULAR; INTRAVENOUS; SUBCUTANEOUS at 22:24

## 2017-04-10 ASSESSMENT — PAIN SCALES - GENERAL: PAINLEVEL_OUTOF10: 7

## 2017-04-10 NOTE — ED NOTES
Pt reports abdominal pain with n/v. Pt has hx of multiple abdominal surgeries. Pt reports normal bm. Pt in NAD. VSS

## 2017-04-10 NOTE — ED AVS SNAPSHOT
4/11/2017          Jaqueline Morocho  [address] [place] [id number]    Dear Jaqueline:    [facility] wants to ensure your discharge home is safe and you or your loved ones have had all your questions answered regarding your care after you leave the hospital.    You may receive a telephone call within two days of your discharge.  This call is to make certain you understand your discharge instructions as well as ensure we provided you with the best care possible during your stay with us.     The call will only last approximately 3-5 minutes and will be done by a nurse.    Once again, we want to ensure your discharge home is safe and that you have a clear understanding of any next steps in your care.  If you have any questions or concerns, please do not hesitate to contact us, we are here for you.  Thank you for choosing [facility] for your healthcare needs.    Sincerely,    Dion Hutson    [facility]

## 2017-04-10 NOTE — ED AVS SNAPSHOT
Home Care Instructions                                                                                                                Jaqueline Morocho   MRN: [id number]    Department:  [facility], Emergency Dept   Date of Visit:  4/10/2017            [facility], Emergency Dept    [address] [place] [address]    Phone:  [phone]      You were seen by     George Montana M.D.      Your Diagnosis Was     Non-intractable vomiting with nausea, unspecified vomiting type     R11.2       These are the medications you received during your hospitalization from 04/10/2017 1524 to 04/11/2017 0032     Date/Time Order Dose Route Action    04/10/2017 2221 NS infusion 1,000 mL 1,000 mL Intravenous New Bag    04/10/2017 2220 ondansetron (ZOFRAN) syringe/vial injection 4 mg 4 mg Intravenous Given    04/10/2017 2224 HYDROmorphone (DILAUDID) injection 0.5 mg 0.5 mg Intravenous Given    04/10/2017 2302 iohexol (OMNIPAQUE) 350 mg/mL 100 mL Intravenous Given    04/10/2017 2304 iohexol (OMNIPAQUE) 240 mg/mL 50 mL Oral Given    04/11/2017 0015 HYDROmorphone (DILAUDID) injection 0.5 mg 0.5 mg Intravenous Given      Follow-up Information     1. Follow up with Stewart Pena M.D. In 2 days.    Specialty:  Family Medicine    Contact information    [address]  [address]  [facility] [address]  [phone]          2. Follow up with [facility], Emergency Dept.    Specialty:  Emergency Medicine    Why:  As needed, If symptoms worsen    Contact information    [address] [place] [id number] [phone]      Medication Information     Review all of your home medications and newly ordered medications with your primary doctor and/or pharmacist as soon as possible. Follow medication instructions as directed by your doctor and/or pharmacist.     Please keep your complete medication list with you and share with your physician. Update the information when medications are discontinued,  doses are changed, or new medications (including over-the-counter products) are added; and carry medication information at all times in the event of emergency situations.               Medication List      START taking these medications        Instructions    Morning Afternoon Evening Bedtime    ondansetron 4 MG Tbdp   Commonly known as:  ZOFRAN ODT        Take 1 Tab by mouth every 8 hours as needed for Nausea/Vomiting.   Dose:  4 mg                          ASK your doctor about these medications        Instructions    Morning Afternoon Evening Bedtime    * atenolol 50 MG Tabs   Commonly known as:  TENORMIN        Take 1 Tab by mouth every day.   Dose:  50 mg                        * atenolol 50 MG Tabs   Commonly known as:  TENORMIN        Take 1 Tab by mouth every day.   Dose:  50 mg                        * citalopram 40 MG Tabs   Commonly known as:  CELEXA        Take 1 Tab by mouth every day.   Dose:  40 mg                        * citalopram 40 MG Tabs   Commonly known as:  CELEXA        Take 1 Tab by mouth every day.   Dose:  40 mg                        hydrocodone-acetaminophen 5-325 MG Tabs per tablet   Commonly known as:  NORCO        Take 1-2 Tabs by mouth every 6 hours as needed.   Dose:  1-2 Tab                        ibuprofen 200 MG Tabs   Commonly known as:  MOTRIN        Take 200 mg by mouth every 6 hours as needed.   Dose:  200 mg                        * lisinopril-hydrochlorothiazide 10-12.5 MG per tablet   Commonly known as:  PRINZIDE, ZESTORETIC        Take 1 Tab by mouth every day.   Dose:  1 Tab                        * lisinopril-hydrochlorothiazide 10-12.5 MG per tablet   Commonly known as:  PRINZIDE, ZESTORETIC        Take 1 Tab by mouth every day.   Dose:  1 Tab                        * Notice:  This list has 6 medication(s) that are the same as other medications prescribed for you. Read the directions carefully, and ask your doctor or other care provider to review them with you.           Where to Get Your Medications      These medications were sent to [id number] - [place], [place] - [address]  [address] [address]     Phone:  [phone]    - ondansetron 4 MG Tbdp            Procedures and tests performed during your visit     CARDIAC MONITORING    CBC WITH DIFFERENTIAL    COMP METABOLIC PANEL    CONSENT FOR CONTRAST INJECTION    CT-ABDOMEN-PELVIS WITH    ESTIMATED GFR    LIPASE    O2 Protocol    POC UA    POC URINE PREGNANCY    SALINE LOCK        Discharge Instructions       Abdominal Pain, Adult  Many things can cause abdominal pain. Usually, abdominal pain is not caused by a disease and will improve without treatment. It can often be observed and treated at home. Your health care provider will do a physical exam and possibly order blood tests and X-rays to help determine the seriousness of your pain. However, in many cases, more time must pass before a clear cause of the pain can be found. Before that point, your health care provider may not know if you need more testing or further treatment.  HOME CARE INSTRUCTIONS   Monitor your abdominal pain for any changes. The following actions may help to alleviate any discomfort you are experiencing:  · Only take over-the-counter or prescription medicines as directed by your health care provider.  · Do not take laxatives unless directed to do so by your health care provider.  · Try a clear liquid diet (broth, tea, or water) as directed by your health care provider. Slowly move to a bland diet as tolerated.  SEEK MEDICAL CARE IF:  · You have unexplained abdominal pain.  · You have abdominal pain associated with nausea or diarrhea.  · You have pain when you urinate or have a bowel movement.  · You experience abdominal pain that wakes you in the night.  · You have abdominal pain that is worsened or improved by eating food.  · You have abdominal pain that is worsened with eating fatty foods.  · You have a fever.  SEEK IMMEDIATE  MEDICAL CARE IF:   · Your pain does not go away within 2 hours.  · You keep throwing up (vomiting).  · Your pain is felt only in portions of the abdomen, such as the right side or the left lower portion of the abdomen.  · You pass bloody or black tarry stools.  MAKE SURE YOU:  · Understand these instructions.    · Will watch your condition.    · Will get help right away if you are not doing well or get worse.       This information is not intended to replace advice given to you by your health care provider. Make sure you discuss any questions you have with your health care provider.     Document Released: 09/27/2006 Document Revised: 01/08/2016 Document Reviewed: 08/27/2014  [employer] Interactive Patient Education ©2016 Elsevier Inc.            Patient Information     Patient Information    Following emergency treatment: all patient requiring follow-up care must return either to a private physician or a clinic if your condition worsens before you are able to obtain further medical attention, please return to the emergency room.     Billing Information    At [facility], we work to make the billing process streamlined for our patients.  Our Representatives are here to answer any questions you may have regarding your hospital bill.  If you have insurance coverage and have supplied your insurance information to us, we will submit a claim to your insurer on your behalf.  Should you have any questions regarding your bill, we can be reached online or by phone as follows:  Online: You are able pay your bills online or live chat with our representatives about any billing questions you may have. We are here to help Monday - Friday from 8:00am to 7:30pm and 9:00am - 12:00pm on Saturdays.  Please visit [email or web address]  for more information.   Phone:  [phone] or [phone]    Please note that your emergency physician, surgeon, pathologist, radiologist, anesthesiologist, and other  specialists are not employed by [facility] and will therefore bill separately for their services.  Please contact them directly for any questions concerning their bills at the numbers below:     Emergency Physician Services:  [phone]  [facility]:  [phone]  [facility]:  [phone]  [facility]:  [phone]    1. Your final bill may vary from the amount quoted upon discharge if all procedures are not complete at that time, or if your doctor has additional procedures of which we are not aware. You will receive an additional bill if you return to the Emergency Department at [facility] for suture removal regardless of the facility of which the sutures were placed.     2. Please arrange for settlement of this account at the emergency registration.    3. All self-pay accounts are due in full at the time of treatment.  If you are unable to meet this obligation then payment is expected within 4-5 days.     4. If you have had radiology studies (CT, X-ray, Ultrasound, MRI), you have received a preliminary result during your emergency department visit. Please contact the radiology department [phone] to receive a copy of your final result. Please discuss the Final result with your primary physician or with the follow up physician provided.     Crisis Hotline:  [place] Crisis Hotline:  2-961-YSFTLKW or 1-209.515.7747  [place] Crisis Hotline:    1-880.905.5973 or 694-308-4107         ED Discharge Follow Up Questions    1. In order to provide you with very good care, we would like to follow up with a phone call in the next few days.  May we have your permission to contact you?     YES /  NO    2. What is the best phone number to call you? (       )_____-__________    3. What is the best time to call you?      Morning  /  Afternoon  /  Evening                   Patient Signature:  ____________________________________________________________    Date:   ____________________________________________________________

## 2017-04-10 NOTE — ED AVS SNAPSHOT
Comparameglio.it Access Code: Activation code not generated  Current Comparameglio.it Status: Active    [employer]  A secure, online tool to manage your health information     [employer]s Comparameglio.it® is a secure, online tool that connects you to your personalized health information from the privacy of your home -- day or night - making it very easy for you to manage your healthcare. Once the activation process is completed, you can even access your medical information using the Comparameglio.it victoriano, which is available for free in the Apple Victoriano store or Google Play store.     Comparameglio.it provides the following levels of access (as shown below):   My Chart Features   [facility] Primary Care Doctor [facility]  Specialists [facility]  Urgent  Care Non-[facility]  Primary Care  Doctor   Email your healthcare team securely and privately 24/7 X X X X   Manage appointments: schedule your next appointment; view details of past/upcoming appointments X      Request prescription refills. X      View recent personal medical records, including lab and immunizations X X X X   View health record, including health history, allergies, medications X X X X   Read reports about your outpatient visits, procedures, consult and ER notes X X X X   See your discharge summary, which is a recap of your hospital and/or ER visit that includes your diagnosis, lab results, and care plan. X X       How to register for [email or web address].it:  1. Go to  [email or web address].  2. Click on the Sign Up Now box, which takes you to the New Member Sign Up page. You will need to provide the following information:  a. Enter your Comparameglio.it Access Code exactly as it appears at the top of this page. (You will not need to use this code after you’ve completed the sign-up process. If you do not sign up before the expiration date, you must request a new code.)   b. Enter your date of birth.   c. Enter your home email address.   d. Click Submit, and follow the next screen’s instructions.  3. Create a Comparameglio.it ID. This will  be your [employer] login ID and cannot be changed, so think of one that is secure and easy to remember.  4. Create a [employer] password. You can change your password at any time.  5. Enter your Password Reset Question and Answer. This can be used at a later time if you forget your password.   6. Enter your e-mail address. This allows you to receive e-mail notifications when new information is available in [employer].  7. Click Sign Up. You can now view your health information.    For assistance activating your [employer] account, call [phone]

## 2017-04-11 VITALS
BODY MASS INDEX: 37.56 KG/M2 | HEART RATE: 72 BPM | HEIGHT: 66 IN | WEIGHT: 233.69 LBS | RESPIRATION RATE: 18 BRPM | SYSTOLIC BLOOD PRESSURE: 138 MMHG | DIASTOLIC BLOOD PRESSURE: 76 MMHG | TEMPERATURE: 96.7 F | OXYGEN SATURATION: 98 %

## 2017-04-11 PROCEDURE — 96376 TX/PRO/DX INJ SAME DRUG ADON: CPT

## 2017-04-11 PROCEDURE — 700111 HCHG RX REV CODE 636 W/ 250 OVERRIDE (IP): Performed by: EMERGENCY MEDICINE

## 2017-04-11 RX ORDER — ONDANSETRON 4 MG/1
4 TABLET, ORALLY DISINTEGRATING ORAL EVERY 8 HOURS PRN
Qty: 10 TAB | Refills: 0 | Status: SHIPPED | OUTPATIENT
Start: 2017-04-11 | End: 2017-08-10

## 2017-04-11 RX ADMIN — HYDROMORPHONE HYDROCHLORIDE 0.5 MG: 1 INJECTION, SOLUTION INTRAMUSCULAR; INTRAVENOUS; SUBCUTANEOUS at 00:15

## 2017-04-11 ASSESSMENT — PAIN SCALES - GENERAL: PAINLEVEL_OUTOF10: 6

## 2017-04-11 NOTE — ED NOTES
Pt states pain returning now. Pain medications requested for patient and Dr. Montana said wait for CT scan result to give medication.

## 2017-04-11 NOTE — DISCHARGE INSTRUCTIONS
Abdominal Pain, Adult  Many things can cause abdominal pain. Usually, abdominal pain is not caused by a disease and will improve without treatment. It can often be observed and treated at home. Your health care provider will do a physical exam and possibly order blood tests and X-rays to help determine the seriousness of your pain. However, in many cases, more time must pass before a clear cause of the pain can be found. Before that point, your health care provider may not know if you need more testing or further treatment.  HOME CARE INSTRUCTIONS   Monitor your abdominal pain for any changes. The following actions may help to alleviate any discomfort you are experiencing:  · Only take over-the-counter or prescription medicines as directed by your health care provider.  · Do not take laxatives unless directed to do so by your health care provider.  · Try a clear liquid diet (broth, tea, or water) as directed by your health care provider. Slowly move to a bland diet as tolerated.  SEEK MEDICAL CARE IF:  · You have unexplained abdominal pain.  · You have abdominal pain associated with nausea or diarrhea.  · You have pain when you urinate or have a bowel movement.  · You experience abdominal pain that wakes you in the night.  · You have abdominal pain that is worsened or improved by eating food.  · You have abdominal pain that is worsened with eating fatty foods.  · You have a fever.  SEEK IMMEDIATE MEDICAL CARE IF:   · Your pain does not go away within 2 hours.  · You keep throwing up (vomiting).  · Your pain is felt only in portions of the abdomen, such as the right side or the left lower portion of the abdomen.  · You pass bloody or black tarry stools.  MAKE SURE YOU:  · Understand these instructions.    · Will watch your condition.    · Will get help right away if you are not doing well or get worse.       This information is not intended to replace advice given to you by your health care provider. Make sure you  discuss any questions you have with your health care provider.     Document Released: 09/27/2006 Document Revised: 01/08/2016 Document Reviewed: 08/27/2014  Elsevier Interactive Patient Education ©2016 Elsevier Inc.

## 2017-04-11 NOTE — ED PROVIDER NOTES
"CHIEF COMPLAINT  Chief Complaint   Patient presents with   • N/V   • Abdominal Pain       HPI  Jaqueline Elaina Morocho is a 46 y.o. female with a history of multiple abdominal surgeries in the past who presents nausea, vomiting, abdominal pain symptoms for the past day or so. Reports probably 5 episodes of emesis. No hematemesis. Last bowel movement was yesterday. Did not note any flatus in the past day. She is concerned that she might have another bowel obstruction. Reports that her abdominal pain is in the left lower quadrant and right upper quadrant regions.  Denies chest pain, shortness of breath, headache, lightheadedness, hematuria. Does note some slight pelvic pain with dysuria symptoms.    REVIEW OF SYSTEMS  See HPI for further details. All other systems are negative.     PAST MEDICAL HISTORY   has a past medical history of Psychiatric disorder and Hypertension.    SOCIAL HISTORY  Social History     Social History Main Topics   • Smoking status: Never Smoker    • Smokeless tobacco: Not on file   • Alcohol Use: No   • Drug Use: No   • Sexual Activity: Not on file       SURGICAL HISTORY  patient denies any surgical history    CURRENT MEDICATIONS  Home Medications     **Home medications have not yet been reviewed for this encounter**          ALLERGIES  Allergies   Allergen Reactions   • Augmentin    • Bactrim [Sulfamethoxazole W-Trimethoprim]    • Cephalosporins    • Demerol    • Fentanyl    • Morphine    • Sulfa Drugs        PHYSICAL EXAM  VITAL SIGNS: /84 mmHg  Pulse 71  Temp(Src) 35.9 °C (96.7 °F)  Resp 16  Ht 1.676 m (5' 5.98\")  Wt 106 kg (233 lb 11 oz)  BMI 37.74 kg/m2  SpO2 97%  Pulse ox interpretation: I interpret this pulse ox as normal.  Constitutional: Alert in no apparent distress.  HENT: No signs of trauma, Bilateral external ears normal, Nose normal.   Eyes: Pupils are equal and reactive, Conjunctiva normal, Non-icteric.   Cardiovascular: Regular rate and rhythm, no murmurs.   Thorax & " Lungs: Normal breath sounds, No respiratory distress, No wheezing, No chest tenderness.   Abdomen: Bowel sounds normal, Soft, mild diffuse abdominal tenderness, No masses, No pulsatile masses. No peritoneal signs.  Skin: Warm, Dry, No erythema, No rash.   Back: No bony tenderness, No CVA tenderness.   Extremities: Intact distal pulses, No edema, No tenderness, No cyanosis  Musculoskeletal: Good range of motion in all major joints. No tenderness to palpation or major deformities noted.   Neurologic: Alert , Normal motor function and gait, Normal sensory function, No focal deficits noted.   Psychiatric: Affect normal, Judgment normal, Mood normal.       DIAGNOSTIC STUDIES / PROCEDURES      LABS  Labs Reviewed   CBC WITH DIFFERENTIAL - Abnormal; Notable for the following:     MCV 99.6 (*)     MCH 33.2 (*)     MCHC 33.3 (*)     Lymphocytes 21.50 (*)     All other components within normal limits   COMP METABOLIC PANEL - Abnormal; Notable for the following:     Sodium 133 (*)     Glucose 119 (*)     Bun 24 (*)     AST(SGOT) 48 (*)     ALT(SGPT) 65 (*)     Alkaline Phosphatase 111 (*)     All other components within normal limits   POC UA - Abnormal; Notable for the following:     POC Blood Trace-lysed (*)     All other components within normal limits   LIPASE   ESTIMATED GFR   POC URINE PREGNANCY     RADIOLOGY  CT-ABDOMEN-PELVIS WITH   Final Result      No CT evidence of bowel obstruction or other acute abnormality      Ileocecal anastomosis, hysterectomy, cholecystectomy, appendectomy, prior abdominal wall hernia repairs      Small fluid in the subcutaneous fat overlying the mesh, without adjacent fat stranding, most likely indicates postoperative seroma. This is favored over abscess formation given the morphology. Clinical correlation is advised      Prior right renal infection is likely due to areas of scarring      Mild splenomegaly          COURSE & MEDICAL DECISION MAKING  Pertinent Labs & Imaging studies reviewed.  "(See chart for details)  46 y.o. female with a history of multiple abdominal surgeries presented with nausea, vomiting, abdominal pain for the past day or so. 5 episodes of emesis prior to arrival however no emesis here in the emergency department. Laboratory studies were performed and are largely unremarkable. No leukocytosis or acidosis. Has slightly elevated liver enzymes and alk phos. Prior history of cholecystectomy however. Slight elevation BUN which may be consistent with dehydration. Patient was given IV fluid hydration. Also hydrated prior to IV contrast study of the abdomen and pelvis. This is done using IV and oral contrast.    Results of this CT examination were largely unremarkable without any acute findings to explain the patient's symptoms that she is having today. The patient was reexamined at bedside with benign abdominal exam.    /76 mmHg  Pulse 72  Temp(Src) 35.9 °C (96.7 °F)  Resp 18  Ht 1.676 m (5' 5.98\")  Wt 106 kg (233 lb 11 oz)  BMI 37.74 kg/m2  SpO2 98%    She was instructed to follow-up with her primary care physician for further management within the next few days. Strict return precautions are provided however for any worsening of her symptoms or develop any other concerning signs or symptoms. This includes fevers, worsening abdominal pain, vomiting, or any other concerning signs or symptoms.    She was discharged home with a short round of antiemetics along with pain medications.    The patient will not drink alcohol nor drive with prescribed medications.   The patient will return for worsening symptoms or failure of improvement and is stable at the time of discharge. The patient verbalizes understanding in their own words.    The patient was referred to primary care where they will receive further BP management.      Stewart Pena M.D.  [address]  [address]  [facility] [address]  [phone]    In 2 days      [facility], Emergency Dept  [address] [facility] " [place] [address]  [phone]    As needed, If symptoms worsen      FINAL IMPRESSION  1. Non-intractable vomiting with nausea, unspecified vomiting type    2. Generalized abdominal pain            Electronically signed by: George Montana, 4/10/2017 9:05 PM

## 2017-05-22 ENCOUNTER — HOSPITAL ENCOUNTER (EMERGENCY)
Facility: MEDICAL CENTER | Age: 47
End: 2017-05-22
Attending: EMERGENCY MEDICINE
Payer: MEDICAID

## 2017-05-22 VITALS
HEIGHT: 66 IN | OXYGEN SATURATION: 96 % | BODY MASS INDEX: 39.72 KG/M2 | DIASTOLIC BLOOD PRESSURE: 95 MMHG | SYSTOLIC BLOOD PRESSURE: 136 MMHG | WEIGHT: 247.14 LBS | HEART RATE: 69 BPM | RESPIRATION RATE: 16 BRPM | TEMPERATURE: 98.2 F

## 2017-05-22 DIAGNOSIS — Z76.0 MEDICATION REFILL: ICD-10-CM

## 2017-05-22 DIAGNOSIS — M54.32 SCIATICA OF LEFT SIDE: ICD-10-CM

## 2017-05-22 LAB
APPEARANCE UR: CLEAR
BILIRUB UR QL STRIP.AUTO: NEGATIVE
COLOR UR: NORMAL
CULTURE IF INDICATED INDCX: NO UA CULTURE
GLUCOSE UR STRIP.AUTO-MCNC: NEGATIVE MG/DL
KETONES UR STRIP.AUTO-MCNC: NEGATIVE MG/DL
LEUKOCYTE ESTERASE UR QL STRIP.AUTO: NEGATIVE
MICRO URNS: NORMAL
NITRITE UR QL STRIP.AUTO: NEGATIVE
PH UR STRIP.AUTO: 5.5 [PH]
PROT UR QL STRIP: NEGATIVE MG/DL
RBC UR QL AUTO: NEGATIVE
SP GR UR STRIP.AUTO: 1.01

## 2017-05-22 PROCEDURE — 99284 EMERGENCY DEPT VISIT MOD MDM: CPT

## 2017-05-22 PROCEDURE — 700111 HCHG RX REV CODE 636 W/ 250 OVERRIDE (IP): Performed by: EMERGENCY MEDICINE

## 2017-05-22 PROCEDURE — A9270 NON-COVERED ITEM OR SERVICE: HCPCS | Performed by: EMERGENCY MEDICINE

## 2017-05-22 PROCEDURE — 700102 HCHG RX REV CODE 250 W/ 637 OVERRIDE(OP): Performed by: EMERGENCY MEDICINE

## 2017-05-22 PROCEDURE — 81003 URINALYSIS AUTO W/O SCOPE: CPT

## 2017-05-22 RX ORDER — CITALOPRAM 40 MG/1
40 TABLET ORAL DAILY
Qty: 30 TAB | Refills: 0 | Status: SHIPPED | OUTPATIENT
Start: 2017-05-22

## 2017-05-22 RX ORDER — METHYLPREDNISOLONE 4 MG/1
TABLET ORAL
Qty: 1 KIT | Refills: 0 | Status: SHIPPED | OUTPATIENT
Start: 2017-05-22

## 2017-05-22 RX ORDER — PREDNISONE 20 MG/1
60 TABLET ORAL ONCE
Status: COMPLETED | OUTPATIENT
Start: 2017-05-22 | End: 2017-05-22

## 2017-05-22 RX ORDER — HYDROCODONE BITARTRATE AND ACETAMINOPHEN 5; 325 MG/1; MG/1
1 TABLET ORAL ONCE
Status: COMPLETED | OUTPATIENT
Start: 2017-05-22 | End: 2017-05-22

## 2017-05-22 RX ORDER — LISINOPRIL AND HYDROCHLOROTHIAZIDE 12.5; 1 MG/1; MG/1
1 TABLET ORAL DAILY
Qty: 30 TAB | Refills: 0 | Status: SHIPPED | OUTPATIENT
Start: 2017-05-22

## 2017-05-22 RX ORDER — HYDROCODONE BITARTRATE AND ACETAMINOPHEN 5; 325 MG/1; MG/1
1-2 TABLET ORAL EVERY 4 HOURS PRN
Qty: 13 TAB | Refills: 0 | Status: SHIPPED | OUTPATIENT
Start: 2017-05-22 | End: 2017-08-10

## 2017-05-22 RX ADMIN — PREDNISONE 60 MG: 20 TABLET ORAL at 22:45

## 2017-05-22 RX ADMIN — HYDROCODONE BITARTRATE AND ACETAMINOPHEN 1 TABLET: 5; 325 TABLET ORAL at 22:45

## 2017-05-22 ASSESSMENT — PAIN SCALES - GENERAL
PAINLEVEL_OUTOF10: 5
PAINLEVEL_OUTOF10: 7

## 2017-05-22 NOTE — ED AVS SNAPSHOT
[employer] Access Code: Activation code not generated  Current [employer] Status: Active    [employer]  A secure, online tool to manage your health information     [employer]® is a secure, online tool that connects you to your personalized health information from the privacy of your home -- day or night - making it very easy for you to manage your healthcare. Once the activation process is completed, you can even access your medical information using the [employer] victoriano, which is available for free in the Apple Victoriano store or Google Play store.     [employer] provides the following levels of access (as shown below):   My Chart Features   [facility] Primary Care Doctor [facility]  Specialists [facility]  Urgent  Care Non-[facility]  Primary Care  Doctor   Email your healthcare team securely and privately 24/7 X X X X   Manage appointments: schedule your next appointment; view details of past/upcoming appointments X      Request prescription refills. X      View recent personal medical records, including lab and immunizations X X X X   View health record, including health history, allergies, medications X X X X   Read reports about your outpatient visits, procedures, consult and ER notes X X X X   See your discharge summary, which is a recap of your hospital and/or ER visit that includes your diagnosis, lab results, and care plan. X X       How to register for [employer]:  1. Go to  [email or web address].  2. Click on the Sign Up Now box, which takes you to the New Member Sign Up page. You will need to provide the following information:  a. Enter your [employer] Access Code exactly as it appears at the top of this page. (You will not need to use this code after you’ve completed the sign-up process. If you do not sign up before the expiration date, you must request a new code.)   b. Enter your date of birth.   c. Enter your home email address.   d. Click Submit, and follow the next screen’s instructions.  3. Create a [employer] ID. This will  be your [employer] login ID and cannot be changed, so think of one that is secure and easy to remember.  4. Create a [employer] password. You can change your password at any time.  5. Enter your Password Reset Question and Answer. This can be used at a later time if you forget your password.   6. Enter your e-mail address. This allows you to receive e-mail notifications when new information is available in [employer].  7. Click Sign Up. You can now view your health information.    For assistance activating your [employer] account, call [phone]

## 2017-05-22 NOTE — ED AVS SNAPSHOT
Home Care Instructions                                                                                                                Jaqueline Morocho   MRN: [id number]    Department:  [facility], Emergency Dept   Date of Visit:  5/22/2017            [facility], Emergency Dept    [address] [address]    Phone:  [phone]      You were seen by     Taya Dorsey M.D.      Your Diagnosis Was     Sciatica of left side     M54.32       Follow-up Information     1. Schedule an appointment as soon as possible for a visit with Stewart Pena M.D..    Specialty:  Family Medicine    Contact information    [address]  [address]  [place] [address]  [phone]          2. Follow up with [facility], Emergency Dept.    Specialty:  Emergency Medicine    Why:  If symptoms worsen    Contact information    [address] [address] [phone]      Medication Information     Review all of your home medications and newly ordered medications with your primary doctor and/or pharmacist as soon as possible. Follow medication instructions as directed by your doctor and/or pharmacist.     Please keep your complete medication list with you and share with your physician. Update the information when medications are discontinued, doses are changed, or new medications (including over-the-counter products) are added; and carry medication information at all times in the event of emergency situations.               Medication List      START taking these medications        Instructions    Morning Afternoon Evening Bedtime    MethylPREDNISolone 4 MG Tbpk   Commonly known as:  MEDROL DOSEPAK        Use as directed                          CONTINUE taking these medications        Instructions    Morning Afternoon Evening Bedtime    * citalopram 40 MG Tabs   Commonly known as:  CELEXA        Take 1 Tab by mouth every day.   Dose:  40 mg                        * citalopram 40 MG Tabs   Commonly known as:  CELEXA        Take 1 Tab by mouth every day.   Dose:  40 mg                        * lisinopril-hydrochlorothiazide 10-12.5 MG per tablet   Commonly known as:  PRINZIDE, ZESTORETIC        Take 1 Tab by mouth every day.   Dose:  1 Tab                        * lisinopril-hydrochlorothiazide 10-12.5 MG per tablet   Commonly known as:  PRINZIDE, ZESTORETIC        Take 1 Tab by mouth every day.   Dose:  1 Tab                        * Notice:  This list has 4 medication(s) that are the same as other medications prescribed for you. Read the directions carefully, and ask your doctor or other care provider to review them with you.      ASK your doctor about these medications        Instructions    Morning Afternoon Evening Bedtime    * atenolol 50 MG Tabs   Commonly known as:  TENORMIN        Take 1 Tab by mouth every day.   Dose:  50 mg                        * atenolol 50 MG Tabs   Commonly known as:  TENORMIN        Take 1 Tab by mouth every day.   Dose:  50 mg                        * hydrocodone-acetaminophen 5-325 MG Tabs per tablet   What changed:  Another medication with the same name was added. Make sure you understand how and when to take each.   Commonly known as:  NORCO   Ask about: Which instructions should I use?        Take 1-2 Tabs by mouth every 6 hours as needed.   Dose:  1-2 Tab                        * hydrocodone-acetaminophen 5-325 MG Tabs per tablet   What changed:  You were already taking a medication with the same name, and this prescription was added. Make sure you understand how and when to take each.   Commonly known as:  NORCO   Ask about: Which instructions should I use?        Take 1-2 Tabs by mouth every four hours as needed.   Dose:  1-2 Tab                        ibuprofen 200 MG Tabs   Commonly known as:  MOTRIN        Take 200 mg by mouth every 6 hours as needed.   Dose:  200 mg                        ondansetron 4 MG Tbdp   Commonly known as:   ZOFRAN ODT        Take 1 Tab by mouth every 8 hours as needed for Nausea/Vomiting.   Dose:  4 mg                        * Notice:  This list has 4 medication(s) that are the same as other medications prescribed for you. Read the directions carefully, and ask your doctor or other care provider to review them with you.         Where to Get Your Medications      These medications were sent to [id number] - [place] - [address]  [address] [place] [address]     Phone:  [phone]    - MethylPREDNISolone 4 MG Tbpk      You can get these medications from any pharmacy     Bring a paper prescription for each of these medications    - citalopram 40 MG Tabs  - hydrocodone-acetaminophen 5-325 MG Tabs per tablet  - lisinopril-hydrochlorothiazide 10-12.5 MG per tablet            Procedures and tests performed during your visit     URINALYSIS,CULTURE IF INDICATED        Discharge Instructions       Back Exercises  Back exercises help treat and prevent back injuries. The goal of back exercises is to increase the strength of your abdominal and back muscles and the flexibility of your back. These exercises should be started when you no longer have back pain. Back exercises include:  · Pelvic Tilt. Lie on your back with your knees bent. Tilt your pelvis until the lower part of your back is against the floor. Hold this position 5 to 10 sec and repeat 5 to 10 times.  · Knee to Chest. Pull first 1 knee up against your chest and hold for 20 to 30 seconds, repeat this with the other knee, and then both knees. This may be done with the other leg straight or bent, whichever feels better.  · Sit-Ups or Curl-Ups. Bend your knees 90 degrees. Start with tilting your pelvis, and do a partial, slow sit-up, lifting your trunk only 30 to 45 degrees off the floor. Take at least 2 to 3 seconds for each sit-up. Do not do sit-ups with your knees out straight. If partial sit-ups are difficult, simply do the above but with only  tightening your abdominal muscles and holding it as directed.  · Hip-Lift. Lie on your back with your knees flexed 90 degrees. Push down with your feet and shoulders as you raise your hips a couple inches off the floor; hold for 10 seconds, repeat 5 to 10 times.  · Back arches. Lie on your stomach, propping yourself up on bent elbows. Slowly press on your hands, causing an arch in your low back. Repeat 3 to 5 times. Any initial stiffness and discomfort should lessen with repetition over time.  · Shoulder-Lifts. Lie face down with arms beside your body. Keep hips and torso pressed to floor as you slowly lift your head and shoulders off the floor.  Do not overdo your exercises, especially in the beginning. Exercises may cause you some mild back discomfort which lasts for a few minutes; however, if the pain is more severe, or lasts for more than 15 minutes, do not continue exercises until you see your caregiver. Improvement with exercise therapy for back problems is slow.   See your caregivers for assistance with developing a proper back exercise program.     This information is not intended to replace advice given to you by your health care provider. Make sure you discuss any questions you have with your health care provider.     Document Released: 01/25/2006 Document Revised: 03/11/2013 Document Reviewed: 02/11/2016  [employer] Interactive Patient Education ©2016 [employer]    Medicine Refill at the Emergency Department  We have refilled your medicine today, but it is best for you to get refills through your primary health care provider's office. In the future, please plan ahead so you do not need to get refills from the emergency department.  If the medicine we refilled was a maintenance medicine, you may have received only enough to get you by until you are able to see your regular health care provider.     This information is not intended to replace advice given to you by your health care provider. Make sure you  discuss any questions you have with your health care provider.     Document Released: 04/05/2005 Document Revised: 01/08/2016 Document Reviewed: 03/27/2015  [employer] Interactive Patient Education ©2016 [employer]  Sciatica  Sciatica is pain, weakness, numbness, or tingling along the path of the sciatic nerve. The nerve starts in the lower back and runs down the back of each leg. The nerve controls the muscles in the lower leg and in the back of the knee, while also providing sensation to the back of the thigh, lower leg, and the sole of your foot. Sciatica is a symptom of another medical condition. For instance, nerve damage or certain conditions, such as a herniated disk or bone spur on the spine, pinch or put pressure on the sciatic nerve. This causes the pain, weakness, or other sensations normally associated with sciatica. Generally, sciatica only affects one side of the body.  CAUSES   · Herniated or slipped disc.  · Degenerative disk disease.  · A pain disorder involving the narrow muscle in the buttocks (piriformis syndrome).  · Pelvic injury or fracture.  · Pregnancy.  · Tumor (rare).  SYMPTOMS   Symptoms can vary from mild to very severe. The symptoms usually travel from the low back to the buttocks and down the back of the leg. Symptoms can include:  · Mild tingling or dull aches in the lower back, leg, or hip.  · Numbness in the back of the calf or sole of the foot.  · Burning sensations in the lower back, leg, or hip.  · Sharp pains in the lower back, leg, or hip.  · Leg weakness.  · Severe back pain inhibiting movement.  These symptoms may get worse with coughing, sneezing, laughing, or prolonged sitting or standing. Also, being overweight may worsen symptoms.  DIAGNOSIS   Your caregiver will perform a physical exam to look for common symptoms of sciatica. He or she may ask you to do certain movements or activities that would trigger sciatic nerve pain. Other tests may be performed to find the cause  of the sciatica. These may include:  · Blood tests.  · X-rays.  · Imaging tests, such as an MRI or CT scan.  TREATMENT   Treatment is directed at the cause of the sciatic pain. Sometimes, treatment is not necessary and the pain and discomfort goes away on its own. If treatment is needed, your caregiver may suggest:  · Over-the-counter medicines to relieve pain.  · Prescription medicines, such as anti-inflammatory medicine, muscle relaxants, or narcotics.  · Applying heat or ice to the painful area.  · Steroid injections to lessen pain, irritation, and inflammation around the nerve.  · Reducing activity during periods of pain.  · Exercising and stretching to strengthen your abdomen and improve flexibility of your spine. Your caregiver may suggest losing weight if the extra weight makes the back pain worse.  · Physical therapy.  · Surgery to eliminate what is pressing or pinching the nerve, such as a bone spur or part of a herniated disk.  HOME CARE INSTRUCTIONS   · Only take over-the-counter or prescription medicines for pain or discomfort as directed by your caregiver.  · Apply ice to the affected area for 20 minutes, 3-4 times a day for the first 48-72 hours. Then try heat in the same way.  · Exercise, stretch, or perform your usual activities if these do not aggravate your pain.  · Attend physical therapy sessions as directed by your caregiver.  · Keep all follow-up appointments as directed by your caregiver.  · Do not wear high heels or shoes that do not provide proper support.  · Check your mattress to see if it is too soft. A firm mattress may lessen your pain and discomfort.  SEEK IMMEDIATE MEDICAL CARE IF:   · You lose control of your bowel or bladder (incontinence).  · You have increasing weakness in the lower back, pelvis, buttocks, or legs.  · You have redness or swelling of your back.  · You have a burning sensation when you urinate.  · You have pain that gets worse when you lie down or awakens you at  night.  · Your pain is worse than you have experienced in the past.  · Your pain is lasting longer than 4 weeks.  · You are suddenly losing weight without reason.  MAKE SURE YOU:  · Understand these instructions.  · Will watch your condition.  · Will get help right away if you are not doing well or get worse.     This information is not intended to replace advice given to you by your health care provider. Make sure you discuss any questions you have with your health care provider.     Document Released: 12/12/2002 Document Revised: 06/18/2013 Document Reviewed: 04/28/2013  [employer] Interactive Patient Education ©2016 [employer].            Patient Information     Patient Information    Following emergency treatment: all patient requiring follow-up care must return either to a private physician or a clinic if your condition worsens before you are able to obtain further medical attention, please return to the emergency room.     Billing Information    At [facility], we work to make the billing process streamlined for our patients.  Our Representatives are here to answer any questions you may have regarding your hospital bill.  If you have insurance coverage and have supplied your insurance information to us, we will submit a claim to your insurer on your behalf.  Should you have any questions regarding your bill, we can be reached online or by phone as follows:  Online: You are able pay your bills online or live chat with our representatives about any billing questions you may have. We are here to help Monday - Friday from 8:00am to 7:30pm and 9:00am - 12:00pm on Saturdays.  Please visit [email or web address]  for more information.   Phone:  [phone] or [phone]    Please note that your emergency physician, surgeon, pathologist, radiologist, anesthesiologist, and other specialists are not employed by [facility] and will therefore bill separately for their services.  Please  contact them directly for any questions concerning their bills at the numbers below:     Emergency Physician Services:  [phone]  [facility]:  [phone]  [facility]:  [phone]  [facility]:  [phone]    1. Your final bill may vary from the amount quoted upon discharge if all procedures are not complete at that time, or if your doctor has additional procedures of which we are not aware. You will receive an additional bill if you return to the Emergency Department at [facility] for suture removal regardless of the facility of which the sutures were placed.     2. Please arrange for settlement of this account at the emergency registration.    3. All self-pay accounts are due in full at the time of treatment.  If you are unable to meet this obligation then payment is expected within 4-5 days.     4. If you have had radiology studies (CT, X-ray, Ultrasound, MRI), you have received a preliminary result during your emergency department visit. Please contact the radiology department [phone] to receive a copy of your final result. Please discuss the Final result with your primary physician or with the follow up physician provided.     Crisis Hotline:  [place] Crisis Hotline:  1-117-HEVBCAQ or 1-606.249.5198  [place] Crisis Hotline:    1-448.262.4457 or 520-793-0458         ED Discharge Follow Up Questions    1. In order to provide you with very good care, we would like to follow up with a phone call in the next few days.  May we have your permission to contact you?     YES /  NO    2. What is the best phone number to call you? (       )_____-__________    3. What is the best time to call you?      Morning  /  Afternoon  /  Evening                   Patient Signature:  ____________________________________________________________    Date:  ____________________________________________________________

## 2017-05-22 NOTE — ED AVS SNAPSHOT
5/22/2017    Jaqueline Morocho  [address] [place] [address]    Dear Jaqueline:    [facility] wants to ensure your discharge home is safe and you or your loved ones have had all of your questions answered regarding your care after you leave the hospital.    Below is a list of resources and contact information should you have any questions regarding your hospital stay, follow-up instructions, or active medical symptoms.    Questions or Concerns Regarding… Contact   Medical Questions Related to Your Discharge  (7 days a week, 8am-5pm) Contact a Nurse Care Coordinator   [phone]   Medical Questions Not Related to Your Discharge  (24 hours a day / 7 days a week)  Contact the Nurse Health Line   [phone]    Medications or Discharge Instructions Refer to your discharge packet   or contact your [facility] Primary Care Provider   [phone]   Follow-up Appointment(s) Schedule your appointment via [employer]   or contact Scheduling [phone]   Billing Review your statement via [employer]  or contact Billing [phone]   Medical Records Review your records via [employer]   or contact Medical Records [phone]     You may receive a telephone call within two days of discharge. This call is to make certain you understand your discharge instructions and have the opportunity to have any questions answered. You can also easily access your medical information, test results and upcoming appointments via the [employer] free online health management tool. You can learn more and sign up at [employer]. For assistance setting up your [employer] account, please call [phone].    Once again, we want to ensure your discharge home is safe and that you have a clear understanding of any next steps in your care. If you have any questions or concerns, please do not hesitate to contact us, we are here for you. Thank you for choosing [facility] for your healthcare needs.    Sincerely,    Your [facility] Healthcare Team

## 2017-05-23 NOTE — ED NOTES
"Patient ambulatory to [facility]:  Chief Complaint   Patient presents with   • Low Back Pain     Radiating down left leg x2 days, denies trauma.     • Medication Refill     PCP appt canceled: celexa 40mg Q day and lisopril/hcz 10.2mg Q day   • Peripheral Edema     BLE edema; pt out of medication x4 days, pt reports edema \"probably from not taking water pill\".     Explained wait time and triage process to pt. Pt placed back out in lobby, told to notify ED tech or triage RN of any changes.     "

## 2017-05-23 NOTE — ED PROVIDER NOTES
"ED Provider Note    Scribed for Taya Dorsey M.D. by Bronwyn Deutsch. 5/22/2017, 10:23 PM.    Primary care provider: Stewart Pena M.D.  Means of arrival: Walk-in  History obtained from: Patient  History limited by: None     CHIEF COMPLAINT  Chief Complaint   Patient presents with   • Low Back Pain     Radiating down left leg x2 days, denies trauma.     • Medication Refill     PCP appt canceled: celexa 40mg Q day and lisopril/hcz 10.2mg Q day   • Peripheral Edema     BLE edema; pt out of medication x4 days, pt reports edema \"probably from not taking water pill\".     HPI  Jaqueline Morocho is a 46 y.o. female who presents to the Emergency Department for lower back pain with radiation to her left lower extremity onset three days ago. The patient describes the pain as \"achy\" and notes that she started a new job where she stands often. She denies any trauma or falls. The patient reports that she has had swelling to her bilateral lower extremities after she ran out of lasix four days ago. She notes that she missed an appointment with her primary care and is also out of celexa and lisinopril. The patient denies having a follow up appointment yet.     REVIEW OF SYSTEMS  See HPI for further details.   E    PAST MEDICAL HISTORY   has a past medical history of Psychiatric disorder and Hypertension.    SURGICAL HISTORY  patient denies any surgical history    SOCIAL HISTORY  Social History   Substance Use Topics   • Smoking status: Never Smoker    • Smokeless tobacco: None   • Alcohol Use: No      History   Drug Use No     FAMILY HISTORY  No family history noted    CURRENT MEDICATIONS  Reviewed. See Encounter Summary.     ALLERGIES  Allergies   Allergen Reactions   • Augmentin    • Bactrim [Sulfamethoxazole W-Trimethoprim]    • Cephalosporins    • Demerol    • Fentanyl    • Morphine    • Sulfa Drugs      PHYSICAL EXAM  VITAL SIGNS: /79 mmHg  Pulse 78  Temp(Src) 36.8 °C (98.2 °F)  Resp 18  Ht 1.676 m (5' 6\")  " Wt 112.1 kg (247 lb 2.2 oz)  BMI 39.91 kg/m2  SpO2 95%   Pulse ox interpretation: I interpret this pulse ox as normal.  Constitutional: Alert in no apparent distress.  HENT: Normocephalic, Atraumatic  Eyes: Pupils are equal and reactive. Conjunctiva normal, non-icteric.   Heart: Regular rate and rythm, no murmurs.    Lungs: Clear to auscultation bilaterally.  Abdomen: Non-tender, non-distended, normal bowel sounds  Skin: Warm, Dry, No erythema, No rash.   Extremities: positive straight leg raise on left without weakness 5/5 at b/l hips/ankles, bilateral lower trace extremity edema, no tenderness to palpitation or deformities noted, normal range of motion to all major joints.   Neurologic: Alert, Grossly non-focal.     DIFFERENTIAL DIAGNOSIS AND WORK UP PLAN  I have queried the patient in the prescription monitoring program, I do not see any evidence of prescription abuse.    10:23 PM Patient seen and examined at bedside. Ordered for urinalysis to evaluate. Patient will be treated with 60 mg Deltasone PO, 1 X 5-325 mg Norco PO for her symptoms. I am prescribing the patient norco for sciatica and celexa, lisinopril and lasix that she it out of. Discussed use of stretching, low sodium diet and weight loss to improve her symptoms. She was given referral to her primary care provider and instructed to return to the emergency department for any worsening symptoms including worsening back pain.     DIAGNOSTIC STUDIES / PROCEDURES     LABS  Labs Reviewed   URINALYSIS,CULTURE IF INDICATED   All labs were reviewed by me.    COURSE & MEDICAL DECISION MAKING  Pertinent Labs & Imaging studies reviewed. (See chart for details)  This is a 46 y.o. year old female who presents with signs and symptoms consistent with sciatica on the left is patient's been on her feet more often with a new job. Low concern for an infectious or traumatic process she denies falls fevers or chills. She does have some trace pitting edema bilaterally  "secondary to missing hydrochlorothiazide, she'll receive a month's refill of both her lisinopril/hydrochlorothiazide and Celexa. She'll follow up with her primary physician or return to the emergency department for new or worsening symptoms such as weakness numbness or tingling down the leg for worsening difficulty in regulating.    /95 mmHg  Pulse 69  Temp(Src) 36.8 °C (98.2 °F)  Resp 16  Ht 1.676 m (5' 6\")  Wt 112.1 kg (247 lb 2.2 oz)  BMI 39.91 kg/m2  SpO2 96%      I reviewed prescription monitoring program for patient's narcotic use before prescribing a scheduled drug.The patient will not drink alcohol nor drive with prescribed medications. The patient will return for new or worsening symptoms and is stable at the time of discharge.    The patient is referred to a primary physician for blood pressure management, diabetic screening, and for all other preventative health concerns.    DISPOSITION:  Patient will be discharged home in stable condition.    FOLLOW UP:  Stewart Pena M.D.  [address] [place] [address] [phone]  [id number]  [facility] [address]  [phone]    Schedule an appointment as soon as possible for a visit      [facility], Emergency Dept  [address] [facility] [address] [phone]    If symptoms worsen      OUTPATIENT MEDICATIONS:  Discharge Medication List as of 5/22/2017 10:42 PM      START taking these medications    Details   MethylPREDNISolone (MEDROL DOSEPAK) 4 MG Tablet Therapy Pack Use as directed, Disp-1 Kit, R-0, Normal      !! hydrocodone-acetaminophen (NORCO) 5-325 MG Tab per tablet Take 1-2 Tabs by mouth every four hours as needed., Disp-13 Tab, R-0, Print Rx Paper       !! - Potential duplicate medications found. Please discuss with provider.        FINAL IMPRESSION  1. Sciatica of left side    2. Medication refill        I, Bronwyn Deutsch (Scribe), am scribing for, and in the presence of, Taya Dorsey M.D..    Electronically signed by: Bronwyn Deutsch " (Scribe), 5/22/2017    ITaya M.D. personally performed the services described in this documentation, as scribed by Bronwyn Deutsch in my presence, and it is both accurate and complete.    The note accurately reflects work and decisions made by me.  Taya Dorsey  5/23/2017  12:17 AM      This dictation has been created using voice recognition software and/or scribes. The accuracy of the dictation is limited by the abilities of the software and the expertise of the scribes. I expect there may be some errors of grammar and possibly content. I made every attempt to manually correct the errors within my dictation. However, errors related to voice recognition software and/or scribes may still exist and should be interpreted within the appropriate context.

## 2017-05-23 NOTE — DISCHARGE INSTRUCTIONS
Back Exercises  Back exercises help treat and prevent back injuries. The goal of back exercises is to increase the strength of your abdominal and back muscles and the flexibility of your back. These exercises should be started when you no longer have back pain. Back exercises include:  · Pelvic Tilt. Lie on your back with your knees bent. Tilt your pelvis until the lower part of your back is against the floor. Hold this position 5 to 10 sec and repeat 5 to 10 times.  · Knee to Chest. Pull first 1 knee up against your chest and hold for 20 to 30 seconds, repeat this with the other knee, and then both knees. This may be done with the other leg straight or bent, whichever feels better.  · Sit-Ups or Curl-Ups. Bend your knees 90 degrees. Start with tilting your pelvis, and do a partial, slow sit-up, lifting your trunk only 30 to 45 degrees off the floor. Take at least 2 to 3 seconds for each sit-up. Do not do sit-ups with your knees out straight. If partial sit-ups are difficult, simply do the above but with only tightening your abdominal muscles and holding it as directed.  · Hip-Lift. Lie on your back with your knees flexed 90 degrees. Push down with your feet and shoulders as you raise your hips a couple inches off the floor; hold for 10 seconds, repeat 5 to 10 times.  · Back arches. Lie on your stomach, propping yourself up on bent elbows. Slowly press on your hands, causing an arch in your low back. Repeat 3 to 5 times. Any initial stiffness and discomfort should lessen with repetition over time.  · Shoulder-Lifts. Lie face down with arms beside your body. Keep hips and torso pressed to floor as you slowly lift your head and shoulders off the floor.  Do not overdo your exercises, especially in the beginning. Exercises may cause you some mild back discomfort which lasts for a few minutes; however, if the pain is more severe, or lasts for more than 15 minutes, do not continue exercises until you see your caregiver.  Improvement with exercise therapy for back problems is slow.   See your caregivers for assistance with developing a proper back exercise program.     This information is not intended to replace advice given to you by your health care provider. Make sure you discuss any questions you have with your health care provider.     Document Released: 01/25/2006 Document Revised: 03/11/2013 Document Reviewed: 02/11/2016  [employer] Interactive Patient Education ©2016 [employer]    Medicine Refill at the Emergency Department  We have refilled your medicine today, but it is best for you to get refills through your primary health care provider's office. In the future, please plan ahead so you do not need to get refills from the emergency department.  If the medicine we refilled was a maintenance medicine, you may have received only enough to get you by until you are able to see your regular health care provider.     This information is not intended to replace advice given to you by your health care provider. Make sure you discuss any questions you have with your health care provider.     Document Released: 04/05/2005 Document Revised: 01/08/2016 Document Reviewed: 03/27/2015  [employer] Patient Education ©2016 [employer]  Sciatica  Sciatica is pain, weakness, numbness, or tingling along the path of the sciatic nerve. The nerve starts in the lower back and runs down the back of each leg. The nerve controls the muscles in the lower leg and in the back of the knee, while also providing sensation to the back of the thigh, lower leg, and the sole of your foot. Sciatica is a symptom of another medical condition. For instance, nerve damage or certain conditions, such as a herniated disk or bone spur on the spine, pinch or put pressure on the sciatic nerve. This causes the pain, weakness, or other sensations normally associated with sciatica. Generally, sciatica only affects one side of the body.  CAUSES   · Herniated or  slipped disc.  · Degenerative disk disease.  · A pain disorder involving the narrow muscle in the buttocks (piriformis syndrome).  · Pelvic injury or fracture.  · Pregnancy.  · Tumor (rare).  SYMPTOMS   Symptoms can vary from mild to very severe. The symptoms usually travel from the low back to the buttocks and down the back of the leg. Symptoms can include:  · Mild tingling or dull aches in the lower back, leg, or hip.  · Numbness in the back of the calf or sole of the foot.  · Burning sensations in the lower back, leg, or hip.  · Sharp pains in the lower back, leg, or hip.  · Leg weakness.  · Severe back pain inhibiting movement.  These symptoms may get worse with coughing, sneezing, laughing, or prolonged sitting or standing. Also, being overweight may worsen symptoms.  DIAGNOSIS   Your caregiver will perform a physical exam to look for common symptoms of sciatica. He or she may ask you to do certain movements or activities that would trigger sciatic nerve pain. Other tests may be performed to find the cause of the sciatica. These may include:  · Blood tests.  · X-rays.  · Imaging tests, such as an MRI or CT scan.  TREATMENT   Treatment is directed at the cause of the sciatic pain. Sometimes, treatment is not necessary and the pain and discomfort goes away on its own. If treatment is needed, your caregiver may suggest:  · Over-the-counter medicines to relieve pain.  · Prescription medicines, such as anti-inflammatory medicine, muscle relaxants, or narcotics.  · Applying heat or ice to the painful area.  · Steroid injections to lessen pain, irritation, and inflammation around the nerve.  · Reducing activity during periods of pain.  · Exercising and stretching to strengthen your abdomen and improve flexibility of your spine. Your caregiver may suggest losing weight if the extra weight makes the back pain worse.  · Physical therapy.  · Surgery to eliminate what is pressing or pinching the nerve, such as a bone spur  or part of a herniated disk.  HOME CARE INSTRUCTIONS   · Only take over-the-counter or prescription medicines for pain or discomfort as directed by your caregiver.  · Apply ice to the affected area for 20 minutes, 3-4 times a day for the first 48-72 hours. Then try heat in the same way.  · Exercise, stretch, or perform your usual activities if these do not aggravate your pain.  · Attend physical therapy sessions as directed by your caregiver.  · Keep all follow-up appointments as directed by your caregiver.  · Do not wear high heels or shoes that do not provide proper support.  · Check your mattress to see if it is too soft. A firm mattress may lessen your pain and discomfort.  SEEK IMMEDIATE MEDICAL CARE IF:   · You lose control of your bowel or bladder (incontinence).  · You have increasing weakness in the lower back, pelvis, buttocks, or legs.  · You have redness or swelling of your back.  · You have a burning sensation when you urinate.  · You have pain that gets worse when you lie down or awakens you at night.  · Your pain is worse than you have experienced in the past.  · Your pain is lasting longer than 4 weeks.  · You are suddenly losing weight without reason.  MAKE SURE YOU:  · Understand these instructions.  · Will watch your condition.  · Will get help right away if you are not doing well or get worse.     This information is not intended to replace advice given to you by your health care provider. Make sure you discuss any questions you have with your health care provider.     Document Released: 12/12/2002 Document Revised: 06/18/2013 Document Reviewed: 04/28/2013  [employer] Interactive Patient Education ©2016 [employer]

## 2017-05-23 NOTE — ED NOTES
"Pt presents c/o back pain to left side of back, radiates down the LLE, \"it feels like my leg locks up\", Pt also requesting refills for lisinopril + water pill, and celexa. Pt attempting to get appt with PCP, they called and cancelled appointment today. Pt ambulatory with steady gait, AVALOS, denies numbness or tingling. Assessment as charted. Chart up for ERP.   "

## 2017-06-21 ENCOUNTER — HOSPITAL ENCOUNTER (EMERGENCY)
Facility: MEDICAL CENTER | Age: 47
End: 2017-06-21
Attending: EMERGENCY MEDICINE
Payer: MEDICAID

## 2017-06-21 VITALS
HEIGHT: 66 IN | OXYGEN SATURATION: 94 % | HEART RATE: 84 BPM | RESPIRATION RATE: 18 BRPM | DIASTOLIC BLOOD PRESSURE: 95 MMHG | BODY MASS INDEX: 38.23 KG/M2 | WEIGHT: 237.88 LBS | SYSTOLIC BLOOD PRESSURE: 165 MMHG | TEMPERATURE: 97.3 F

## 2017-06-21 DIAGNOSIS — B34.9 VIREMIA: ICD-10-CM

## 2017-06-21 DIAGNOSIS — J40 BRONCHITIS: ICD-10-CM

## 2017-06-21 PROCEDURE — 99283 EMERGENCY DEPT VISIT LOW MDM: CPT

## 2017-06-21 RX ORDER — AZITHROMYCIN 250 MG/1
250 TABLET, FILM COATED ORAL DAILY
Qty: 6 TAB | Refills: 0 | Status: SHIPPED | OUTPATIENT
Start: 2017-06-21 | End: 2017-06-26

## 2017-06-21 RX ORDER — CODEINE PHOSPHATE/GUAIFENESIN 10-100MG/5
10 LIQUID (ML) ORAL 4 TIMES DAILY PRN
Qty: 120 ML | Refills: 0 | Status: SHIPPED | OUTPATIENT
Start: 2017-06-21

## 2017-06-21 ASSESSMENT — ENCOUNTER SYMPTOMS
COUGH: 1
BACK PAIN: 1
FEVER: 0
VOMITING: 1
SPUTUM PRODUCTION: 0
NAUSEA: 1
HEADACHES: 1
CHILLS: 1

## 2017-06-21 ASSESSMENT — PAIN SCALES - GENERAL: PAINLEVEL_OUTOF10: 4

## 2017-06-21 NOTE — ED AVS SNAPSHOT
Home Care Instructions                                                                                                                Jaqueline Morocho   MRN: [id number]    Department:  [facility], Emergency Dept   Date of Visit:  6/21/2017            [facility], Emergency Dept    [address]    [facility] [address]    Phone:  [phone]      You were seen by     Gumaro Luo M.D.      Your Diagnosis Was     Bronchitis     J40       Follow-up Information     1. Schedule an appointment as soon as possible for a visit with Stewart Pena M.D..    Specialty:  Family Medicine    Why:  As needed    Contact information    [address]  [address]  [facility] [id number] [phone]        Medication Information     Review all of your home medications and newly ordered medications with your primary doctor and/or pharmacist as soon as possible. Follow medication instructions as directed by your doctor and/or pharmacist.     Please keep your complete medication list with you and share with your physician. Update the information when medications are discontinued, doses are changed, or new medications (including over-the-counter products) are added; and carry medication information at all times in the event of emergency situations.               Medication List      START taking these medications        Instructions    Morning Afternoon Evening Bedtime    azithromycin 250 MG Tabs   Commonly known as:  ZITHROMAX Z-ANGEL        Take 1 Tab by mouth every day for 5 days. Take 2 pills on first day and 1 pill on next 4 days   Dose:  250 mg                        guaifenesin-codeine 100-10 MG/5ML syrup   Commonly known as:  TUSSI-ORGANIDIN NR        Take 10 mL by mouth 4 times a day as needed for Cough.   Dose:  10 mL                          ASK your doctor about these medications        Instructions    Morning Afternoon Evening Bedtime    * atenolol 50 MG Tabs   Commonly known as:  TENORMIN        Take 1 Tab by mouth every day.   Dose:  50 mg                        * atenolol 50 MG Tabs   Commonly known as:  TENORMIN        Take 1 Tab by mouth every day.   Dose:  50 mg                        * citalopram 40 MG Tabs   Commonly known as:  CELEXA        Take 1 Tab by mouth every day.   Dose:  40 mg                        * citalopram 40 MG Tabs   Commonly known as:  CELEXA        Take 1 Tab by mouth every day.   Dose:  40 mg                        * hydrocodone-acetaminophen 5-325 MG Tabs per tablet   Commonly known as:  NORCO        Take 1-2 Tabs by mouth every 6 hours as needed.   Dose:  1-2 Tab                        * hydrocodone-acetaminophen 5-325 MG Tabs per tablet   Commonly known as:  NORCO        Take 1-2 Tabs by mouth every four hours as needed.   Dose:  1-2 Tab                        ibuprofen 200 MG Tabs   Commonly known as:  MOTRIN        Take 200 mg by mouth every 6 hours as needed.   Dose:  200 mg                        * lisinopril-hydrochlorothiazide 10-12.5 MG per tablet   Commonly known as:  PRINZIDE, ZESTORETIC        Take 1 Tab by mouth every day.   Dose:  1 Tab                        * lisinopril-hydrochlorothiazide 10-12.5 MG per tablet   Commonly known as:  PRINZIDE, ZESTORETIC        Take 1 Tab by mouth every day.   Dose:  1 Tab                        MethylPREDNISolone 4 MG Tbpk   Commonly known as:  MEDROL DOSEPAK        Use as directed                        ondansetron 4 MG Tbdp   Commonly known as:  ZOFRAN ODT        Take 1 Tab by mouth every 8 hours as needed for Nausea/Vomiting.   Dose:  4 mg                        * Notice:  This list has 8 medication(s) that are the same as other medications prescribed for you. Read the directions carefully, and ask your doctor or other care provider to review them with you.         Where to Get Your Medications      You can get these medications from any pharmacy     Bring a paper prescription for each of these medications    - azithromycin  "250 MG Tabs  - guaifenesin-codeine 100-10 MG/5ML syrup              Discharge Instructions       Bronchitis  Bronchitis is a problem of the air tubes leading to your lungs. This problem makes it hard for air to get in and out of the lungs. You may cough a lot because your air tubes are narrow. Going without care can cause lasting (chronic) bronchitis.  HOME CARE   · Drink enough fluids to keep your pee (urine) clear or pale yellow.  · Use a cool mist humidifier.  · Quit smoking if you smoke. If you keep smoking, the bronchitis might not get better.  · Only take medicine as told by your doctor.  GET HELP RIGHT AWAY IF:   · Coughing keeps you awake.  · You start to wheeze.  · You become more sick or weak.  · You have a hard time breathing or get short of breath.  · You cough up blood.  · Coughing lasts more than 2 weeks.  · You have a fever.  · Your baby is older than 3 months with a rectal temperature of 102° F (38.9° C) or higher.  · Your baby is 3 months old or younger with a rectal temperature of 100.4° F (38° C) or higher.  MAKE SURE YOU:  · Understand these instructions.  · Will watch your condition.  · Will get help right away if you are not doing well or get worse.  Document Released: 06/05/2009 Document Revised: 03/11/2013 Document Reviewed: 11/19/2010  ExitCare® Patient Information ©2014 [employer].    Upper Respiratory Infection, Adult  Most upper respiratory infections (URIs) are caused by a virus. A URI affects the nose, throat, and upper air passages. The most common type of URI is often called \"the common cold.\"  HOME CARE   · Take medicines only as told by your doctor.  · Gargle warm saltwater or take cough drops to comfort your throat as told by your doctor.  · Use a warm mist humidifier or inhale steam from a shower to increase air moisture. This may make it easier to breathe.  · Drink enough fluid to keep your pee (urine) clear or pale yellow.  · Eat soups and other clear broths.  · Have a " healthy diet.  · Rest as needed.  · Go back to work when your fever is gone or your doctor says it is okay.  ¨ You may need to stay home longer to avoid giving your URI to others.  ¨ You can also wear a face mask and wash your hands often to prevent spread of the virus.  · Use your inhaler more if you have asthma.  · Do not use any tobacco products, including cigarettes, chewing tobacco, or electronic cigarettes. If you need help quitting, ask your doctor.  GET HELP IF:  · You are getting worse, not better.  · Your symptoms are not helped by medicine.  · You have chills.  · You are getting more short of breath.  · You have brown or red mucus.  · You have yellow or brown discharge from your nose.  · You have pain in your face, especially when you bend forward.  · You have a fever.  · You have puffy (swollen) neck glands.  · You have pain while swallowing.  · You have white areas in the back of your throat.  GET HELP RIGHT AWAY IF:   · You have very bad or constant:  ¨ Headache.  ¨ Ear pain.  ¨ Pain in your forehead, behind your eyes, and over your cheekbones (sinus pain).  ¨ Chest pain.  · You have long-lasting (chronic) lung disease and any of the following:  ¨ Wheezing.  ¨ Long-lasting cough.  ¨ Coughing up blood.  ¨ A change in your usual mucus.  · You have a stiff neck.  · You have changes in your:  ¨ Vision.  ¨ Hearing.  ¨ Thinking.  ¨ Mood.  MAKE SURE YOU:   · Understand these instructions.  · Will watch your condition.  · Will get help right away if you are not doing well or get worse.     This information is not intended to replace advice given to you by your health care provider. Make sure you discuss any questions you have with your health care provider.     Document Released: 06/05/2009 Document Revised: 05/03/2016 Document Reviewed: 03/25/2015  [employer] Interactive Patient Education ©2016 [employer]    Viremia  Your exam shows you have a viral illness. Viremia means your symptoms are due to the  "presence of the virus in your blood. This will often cause a chill or sweat. Other common symptoms of viral infections include fever, muscle aches, headache, fatigue, stomach upsets, sore throat, and dry cough. Antibiotics are not effective in viral illnesses; they are usually only given when there is a secondary bacterial infection.  General treatment includes bed rest, increasing oral fluid intake of clear, non-caffeinated drinks like ginger ale, fruit juices, water, or sports drinks. Medicines to relieve specific symptoms such as cough, pain, or diarrhea may also be prescribed. Only take over-the-counter or prescription medicines for pain, discomfort, or fever as directed by your caregiver.   Please call your doctor if you are not better after 2 to 3 days of symptom treatment. Call or return here right away if your illness gets more severe, or you develop any other new symptoms, such as a fever above 103° F (39.4° C), vomiting for more than a day, severe headache or other pain, stiff neck, trouble breathing, visual problems, \"blackouts\" or fainting.  Document Released: 01/25/2006 Document Revised: 03/11/2013 Document Reviewed: 12/18/2006  ExitCare® Patient Information ©2013 [employer].          Patient Information     Patient Information    Following emergency treatment: all patient requiring follow-up care must return either to a private physician or a clinic if your condition worsens before you are able to obtain further medical attention, please return to the emergency room.     Billing Information    At [facility], we work to make the billing process streamlined for our patients.  Our Representatives are here to answer any questions you may have regarding your hospital bill.  If you have insurance coverage and have supplied your insurance information to us, we will submit a claim to your insurer on your behalf.  Should you have any questions regarding your bill, we can be reached online or by phone as " follows:  Online: You are able pay your bills online or live chat with our representatives about any billing questions you may have. We are here to help Monday - Friday from 8:00am to 7:30pm and 9:00am - 12:00pm on Saturdays.  Please visit [email or web address]  for more information.   Phone:  [phone] or [phone]    Please note that your emergency physician, surgeon, pathologist, radiologist, anesthesiologist, and other specialists are not employed by [facility] and will therefore bill separately for their services.  Please contact them directly for any questions concerning their bills at the numbers below:     Emergency Physician Services:  [phone]  [facility]:  [phone]  [facility]:  [phone]  [facility]:  [phone]    1. Your final bill may vary from the amount quoted upon discharge if all procedures are not complete at that time, or if your doctor has additional procedures of which we are not aware. You will receive an additional bill if you return to the Emergency Department at [facility] for suture removal regardless of the facility of which the sutures were placed.     2. Please arrange for settlement of this account at the emergency registration.    3. All self-pay accounts are due in full at the time of treatment.  If you are unable to meet this obligation then payment is expected within 4-5 days.     4. If you have had radiology studies (CT, X-ray, Ultrasound, MRI), you have received a preliminary result during your emergency department visit. Please contact the radiology department [phone] to receive a copy of your final result. Please discuss the Final result with your primary physician or with the follow up physician provided.     Crisis Hotline:  [place] Crisis Hotline:  0-388-AMZIHTG or 1-724.805.1682  [place] Crisis Hotline:    1-847.336.3831 or 564-498-7960         ED Discharge Follow  Up Questions    1. In order to provide you with very good care, we would like to follow up with a phone call in the next few days.  May we have your permission to contact you?     YES /  NO    2. What is the best phone number to call you? (       )_____-__________    3. What is the best time to call you?      Morning  /  Afternoon  /  Evening                   Patient Signature:  ____________________________________________________________    Date:  ____________________________________________________________

## 2017-06-21 NOTE — ED AVS SNAPSHOT
6/21/2017    Jaqueline Morocho  [address] [place] [id number]    Dear Jaqueline:    [facility] wants to ensure your discharge home is safe and you or your loved ones have had all of your questions answered regarding your care after you leave the hospital.    Below is a list of resources and contact information should you have any questions regarding your hospital stay, follow-up instructions, or active medical symptoms.    Questions or Concerns Regarding… Contact   Medical Questions Related to Your Discharge  (7 days a week, 8am-5pm) Contact a Nurse Care Coordinator   [phone]   Medical Questions Not Related to Your Discharge  (24 hours a day / 7 days a week)  Contact the Nurse Health Line   [phone]    Medications or Discharge Instructions Refer to your discharge packet   or contact your [facility] Primary Care Provider   [phone]   Follow-up Appointment(s) Schedule your appointment via [employer]   or contact Scheduling [phone]   Billing Review your statement via [employer]  or contact Billing [phone]   Medical Records Review your records via [employer]   or contact Medical Records [phone]     You may receive a telephone call within two days of discharge. This call is to make certain you understand your discharge instructions and have the opportunity to have any questions answered. You can also easily access your medical information, test results and upcoming appointments via the [employer] free online health management tool. You can learn more and sign up at [employer]. For assistance setting up your [employer] account, please call [phone].    Once again, we want to ensure your discharge home is safe and that you have a clear understanding of any next steps in your care. If you have any questions or concerns, please do not hesitate to contact us, we are here for you. Thank you for choosing [facility] for your healthcare needs.    Sincerely,    Your [facility] Healthcare Team

## 2017-06-21 NOTE — ED AVS SNAPSHOT
x.ai Access Code: Activation code not generated  Current x.ai Status: Active    [employer]  A secure, online tool to manage your health information     [employer]’s x.ai® is a secure, online tool that connects you to your personalized health information from the privacy of your home -- day or night - making it very easy for you to manage your healthcare. Once the activation process is completed, you can even access your medical information using the x.ai victoriano, which is available for free in the [employer] Victor[employer] store or Google Play store.     x.ai provides the following levels of access (as shown below):   My Chart Features   [facility] Primary Care Doctor [facility]  Specialists [facility]  Urgent  Care Non-[facility]  Primary Care  Doctor   Email your healthcare team securely and privately 24/7 X X X X   Manage appointments: schedule your next appointment; view details of past/upcoming appointments X      Request prescription refills. X      View recent personal medical records, including lab and immunizations X X X X   View health record, including health history, allergies, medications X X X X   Read reports about your outpatient visits, procedures, consult and ER notes X X X X   See your discharge summary, which is a recap of your hospital and/or ER visit that includes your diagnosis, lab results, and care plan. X X       How to register for x.ai:  1. Go to  [email or web address].  2. Click on the Sign Up Now box, which takes you to the New Member Sign Up page. You will need to provide the following information:  a. Enter your x.ai Access Code exactly as it appears at the top of this page. (You will not need to use this code after you’ve completed the sign-up process. If you do not sign up before the expiration date, you must request a new code.)   b. Enter your date of birth.   c. Enter your home email address.   d. Click Submit, and follow the next screen’s instructions.  3. Create a x.ai ID. This will  be your [employer] login ID and cannot be changed, so think of one that is secure and easy to remember.  4. Create a [employer] password. You can change your password at any time.  5. Enter your Password Reset Question and Answer. This can be used at a later time if you forget your password.   6. Enter your e-mail address. This allows you to receive e-mail notifications when new information is available in [employer].  7. Click Sign Up. You can now view your health information.    For assistance activating your [employer] account, call [phone]

## 2017-06-21 NOTE — ED NOTES
"PT ambulated to triage c/o flu like sx x 5 days  Chief Complaint   Patient presents with   • Cough   • Congestion   • Head Ache   • Sore Throat     Blood pressure 165/95, pulse 89, temperature 36.3 °C (97.3 °F), temperature source Temporal, resp. rate 18, height 1.676 m (5' 6\"), weight 107.9 kg (237 lb 14 oz), SpO2 96 %.    "

## 2017-06-22 ENCOUNTER — PATIENT OUTREACH (OUTPATIENT)
Dept: HEALTH INFORMATION MANAGEMENT | Facility: OTHER | Age: 47
End: 2017-06-22

## 2017-06-22 NOTE — PROGRESS NOTES
· Placed discharge outreach phone call to patient s/p ER discharge 6/21/17.  Received recording stating that pt has a voice mailbox that is full.  No answer, no option to leave voicemail.

## 2017-06-22 NOTE — ED PROVIDER NOTES
ED Provider Note    Scribed for Gumaro Luo M.D. by Morteza Ross. 6/21/2017, 7:01 PM.    Primary care provider: Stewart Pena M.D.  Means of arrival: walk-in  History obtained from: patient  History limited by: none    CHIEF COMPLAINT  Chief Complaint   Patient presents with   • Cough   • Congestion   • Head Ache   • Sore Throat       HPI  Jaqueline Morocho is a 47 y.o. female who presents to the Emergency Department complaining of worsening flu-like symptoms, onset 4 days ago with associated dry non-productive cough, headache, nausea and vomiting. She adds that it feels as though her throat is on fire. She also complains of chest and back pain, onset also 4 days ago. She confirms ear pain and chills. She denies fever. Patient is not a smoker. She has no alleviating factors.     Review of chart shows multiple ED visits. Last month was seen for back pain and medication refill. In April was seen for nausea vomiting and abdominal pain. Earlier that month for wrist pain. In February seen for medication refill. In the last 3 months of 2016 patient was seen for back pain and numbness on one occasion neck pain and numbness on another. Another visit for neck pain and medication refill.      A narcotic score of 280. 7 prescribers 6 pharmacies multiple narcotic prescriptions.     REVIEW OF SYSTEMS  Review of Systems   Constitutional: Positive for chills. Negative for fever.   HENT: Positive for ear pain.    Respiratory: Positive for cough. Negative for sputum production.    Cardiovascular: Positive for chest pain.   Gastrointestinal: Positive for nausea and vomiting.   Musculoskeletal: Positive for back pain.   Neurological: Positive for headaches.   All other systems reviewed and are negative.  C.     PAST MEDICAL HISTORY   has a past medical history of Psychiatric disorder and Hypertension.    SURGICAL HISTORY  patient denies any surgical history    SOCIAL HISTORY  Social History   Substance Use Topics   • Smoking  "status: Never Smoker    • Smokeless tobacco: Not on file   • Alcohol Use: No      History   Drug Use No       FAMILY HISTORY  No family history on file.    CURRENT MEDICATIONS  Home Medications     **Home medications have not yet been reviewed for this encounter**          ALLERGIES  Allergies   Allergen Reactions   • Augmentin    • Bactrim [Sulfamethoxazole W-Trimethoprim]    • Cephalosporins    • Demerol    • Fentanyl    • Morphine    • Sulfa Drugs        PHYSICAL EXAM  VITAL SIGNS: /95 mmHg  Pulse 91  Temp(Src) 36.3 °C (97.3 °F) (Temporal)  Resp 18  Ht 1.676 m (5' 6\")  Wt 107.9 kg (237 lb 14 oz)  BMI 38.41 kg/m2  SpO2 95%    Constitutional: Well developed, Well nourished, No acute distress, Non-toxic appearance.   HENT: Normocephalic, Atraumatic, Bilateral external ears normal, Oropharynx moist, no evidence of dehydration, No oral exudates, Nose normal.   Eyes: PERRLA, EOMI, Conjunctiva normal, No discharge.   Neck: Normal range of motion, No tenderness, Supple, No stridor. No masses. No evidence of meningitis or meningismus.   Lymphatic: No lymphadenopathy noted.   Cardiovascular: Normal heart rate, Normal rhythm, No murmurs, No rubs, No gallops.   Thorax & Lungs: Exaggerated cough. Normal breath sounds, No respiratory distress, No wheezing or rhonchi, No chest tenderness.   Abdomen: Bowel sounds normal, Soft, No tenderness, No masses, No pulsatile masses. No guarding or rebound. No evidence of peritoneal findings.  Skin: Warm, Dry, No erythema, No rash. No exanthem.   Back: No tenderness.   Extremities:  No edema, No tenderness, No cyanosis, No clubbing.   Musculoskeletal: Good range of motion in all major joints. No major deformities noted.   Neurologic: Alert & oriented x 3, Normal motor function, No focal deficits noted.   Psychiatric: Affect normal, mood normal.                                                              Review of chart shows multiple ED visits. Last month was seen for back " pain and medication refill. In April was seen for nausea vomiting and abdominal pain. Earlier that month for wrist pain. In February seen for medication refill. In the last 3 months of 2016 patient was seen for back pain and numbness on one occasion neck pain and numbness on another. Another visit for neck pain and medication refill.    A narcotic score of 280. 7 prescribers 6 pharmacies multiple narcotic prescriptions.     7:01 PM - Patient seen and examined at bedside. The differential diagnoses include but are not limited to: Bronchitis, unlikely pneumonia. Will put on antibiotics and treat her cough. She requests a note for work. Explained to the patient she likely has bronchitis. If antibiotics do not help she may have pneumonia. However, she should begin to improve in a few days. The patient understands the treatment plan and is agreeable. She agrees to be discharged home.     I reviewed prescription monitoring program for patient's narcotic use before prescribing a scheduled drug.The patient will not drink alcohol nor drive with prescribed medications. The patient will return for new or worsening symptoms and is stable at the time of discharge.    DISPOSITION:  Patient will be discharged home in stable condition.    FOLLOW UP:  Stewart Pena M.D.  [address]  [address]  [facility] [address]  [phone]    Schedule an appointment as soon as possible for a visit  As needed      OUTPATIENT MEDICATIONS:  New Prescriptions    AZITHROMYCIN (ZITHROMAX Z-ANGEL) 250 MG TAB    Take 1 Tab by mouth every day for 5 days. Take 2 pills on first day and 1 pill on next 4 days    GUAIFENESIN-CODEINE (TUSSI-ORGANIDIN NR) 100-10 MG/5ML SYRUP    Take 10 mL by mouth 4 times a day as needed for Cough.            FINAL IMPRESSION  1. Bronchitis    2. Viremia          Morteza CABRALES (Pritesh), am scribing for, and in the presence of, Gumaro Luo M.D..    Electronically signed by: Morteza Ross (Pritesh), 6/21/2017    Gumaro CABRALES  STERLING Luo. personally performed the services described in this documentation, as scribed by Morteza Ross in my presence, and it is both accurate and complete.    The note accurately reflects work and decisions made by me.  Gumaro Luo  6/22/2017  12:42 AM

## 2017-06-22 NOTE — ED NOTES
Dry cough since Saturday, no noted fevers.  Taking otc with no relief.  Does not smoke.  Congestion.

## 2017-06-22 NOTE — DISCHARGE INSTRUCTIONS
"Bronchitis  Bronchitis is a problem of the air tubes leading to your lungs. This problem makes it hard for air to get in and out of the lungs. You may cough a lot because your air tubes are narrow. Going without care can cause lasting (chronic) bronchitis.  HOME CARE   · Drink enough fluids to keep your pee (urine) clear or pale yellow.  · Use a cool mist humidifier.  · Quit smoking if you smoke. If you keep smoking, the bronchitis might not get better.  · Only take medicine as told by your doctor.  GET HELP RIGHT AWAY IF:   · Coughing keeps you awake.  · You start to wheeze.  · You become more sick or weak.  · You have a hard time breathing or get short of breath.  · You cough up blood.  · Coughing lasts more than 2 weeks.  · You have a fever.  · Your baby is older than 3 months with a rectal temperature of 102° F (38.9° C) or higher.  · Your baby is 3 months old or younger with a rectal temperature of 100.4° F (38° C) or higher.  MAKE SURE YOU:  · Understand these instructions.  · Will watch your condition.  · Will get help right away if you are not doing well or get worse.  Document Released: 06/05/2009 Document Revised: 03/11/2013 Document Reviewed: 11/19/2010  [employer]® Patient Information ©2014 [employer].    Upper Respiratory Infection, Adult  Most upper respiratory infections (URIs) are caused by a virus. A URI affects the nose, throat, and upper air passages. The most common type of URI is often called \"the common cold.\"  HOME CARE   · Take medicines only as told by your doctor.  · Gargle warm saltwater or take cough drops to comfort your throat as told by your doctor.  · Use a warm mist humidifier or inhale steam from a shower to increase air moisture. This may make it easier to breathe.  · Drink enough fluid to keep your pee (urine) clear or pale yellow.  · Eat soups and other clear broths.  · Have a healthy diet.  · Rest as needed.  · Go back to work when your fever is gone or your doctor says it is " okay.  ¨ You may need to stay home longer to avoid giving your URI to others.  ¨ You can also wear a face mask and wash your hands often to prevent spread of the virus.  · Use your inhaler more if you have asthma.  · Do not use any tobacco products, including cigarettes, chewing tobacco, or electronic cigarettes. If you need help quitting, ask your doctor.  GET HELP IF:  · You are getting worse, not better.  · Your symptoms are not helped by medicine.  · You have chills.  · You are getting more short of breath.  · You have brown or red mucus.  · You have yellow or brown discharge from your nose.  · You have pain in your face, especially when you bend forward.  · You have a fever.  · You have puffy (swollen) neck glands.  · You have pain while swallowing.  · You have white areas in the back of your throat.  GET HELP RIGHT AWAY IF:   · You have very bad or constant:  ¨ Headache.  ¨ Ear pain.  ¨ Pain in your forehead, behind your eyes, and over your cheekbones (sinus pain).  ¨ Chest pain.  · You have long-lasting (chronic) lung disease and any of the following:  ¨ Wheezing.  ¨ Long-lasting cough.  ¨ Coughing up blood.  ¨ A change in your usual mucus.  · You have a stiff neck.  · You have changes in your:  ¨ Vision.  ¨ Hearing.  ¨ Thinking.  ¨ Mood.  MAKE SURE YOU:   · Understand these instructions.  · Will watch your condition.  · Will get help right away if you are not doing well or get worse.     This information is not intended to replace advice given to you by your health care provider. Make sure you discuss any questions you have with your health care provider.     Document Released: 06/05/2009 Document Revised: 05/03/2016 Document Reviewed: 03/25/2015  [employer] Interactive Patient Education ©2016 [employer]    Viremia  Your exam shows you have a viral illness. Viremia means your symptoms are due to the presence of the virus in your blood. This will often cause a chill or sweat. Other common symptoms of viral  "infections include fever, muscle aches, headache, fatigue, stomach upsets, sore throat, and dry cough. Antibiotics are not effective in viral illnesses; they are usually only given when there is a secondary bacterial infection.  General treatment includes bed rest, increasing oral fluid intake of clear, non-caffeinated drinks like ginger ale, fruit juices, water, or sports drinks. Medicines to relieve specific symptoms such as cough, pain, or diarrhea may also be prescribed. Only take over-the-counter or prescription medicines for pain, discomfort, or fever as directed by your caregiver.   Please call your doctor if you are not better after 2 to 3 days of symptom treatment. Call or return here right away if your illness gets more severe, or you develop any other new symptoms, such as a fever above 103° F (39.4° C), vomiting for more than a day, severe headache or other pain, stiff neck, trouble breathing, visual problems, \"blackouts\" or fainting.  Document Released: 01/25/2006 Document Revised: 03/11/2013 Document Reviewed: 12/18/2006  [employer]® Patient Information ©2013 [employer].  "

## 2017-08-09 ENCOUNTER — HOSPITAL ENCOUNTER (EMERGENCY)
Facility: MEDICAL CENTER | Age: 47
End: 2017-08-10
Attending: EMERGENCY MEDICINE
Payer: MEDICAID

## 2017-08-09 ENCOUNTER — APPOINTMENT (OUTPATIENT)
Dept: RADIOLOGY | Facility: MEDICAL CENTER | Age: 47
End: 2017-08-09
Attending: INTERNAL MEDICINE
Payer: MEDICAID

## 2017-08-09 DIAGNOSIS — R11.2 NON-INTRACTABLE VOMITING WITH NAUSEA, UNSPECIFIED VOMITING TYPE: ICD-10-CM

## 2017-08-09 DIAGNOSIS — R10.12 LEFT UPPER QUADRANT PAIN: ICD-10-CM

## 2017-08-09 LAB
ANION GAP SERPL CALC-SCNC: 10 MMOL/L (ref 0–11.9)
BASOPHILS # BLD AUTO: 0.5 % (ref 0–1.8)
BASOPHILS # BLD: 0.03 K/UL (ref 0–0.12)
BUN SERPL-MCNC: 16 MG/DL (ref 8–22)
CALCIUM SERPL-MCNC: 9.3 MG/DL (ref 8.5–10.5)
CHLORIDE SERPL-SCNC: 104 MMOL/L (ref 96–112)
CO2 SERPL-SCNC: 24 MMOL/L (ref 20–33)
CREAT SERPL-MCNC: 0.73 MG/DL (ref 0.5–1.4)
EOSINOPHIL # BLD AUTO: 0.18 K/UL (ref 0–0.51)
EOSINOPHIL NFR BLD: 3.3 % (ref 0–6.9)
ERYTHROCYTE [DISTWIDTH] IN BLOOD BY AUTOMATED COUNT: 46.3 FL (ref 35.9–50)
GFR SERPL CREATININE-BSD FRML MDRD: >60 ML/MIN/1.73 M 2
GLUCOSE SERPL-MCNC: 96 MG/DL (ref 65–99)
HCT VFR BLD AUTO: 40.6 % (ref 37–47)
HGB BLD-MCNC: 13.8 G/DL (ref 12–16)
IMM GRANULOCYTES # BLD AUTO: 0.02 K/UL (ref 0–0.11)
IMM GRANULOCYTES NFR BLD AUTO: 0.4 % (ref 0–0.9)
LYMPHOCYTES # BLD AUTO: 1.58 K/UL (ref 1–4.8)
LYMPHOCYTES NFR BLD: 28.8 % (ref 22–41)
MCH RBC QN AUTO: 33.7 PG (ref 27–33)
MCHC RBC AUTO-ENTMCNC: 34 G/DL (ref 33.6–35)
MCV RBC AUTO: 99.3 FL (ref 81.4–97.8)
MONOCYTES # BLD AUTO: 0.45 K/UL (ref 0–0.85)
MONOCYTES NFR BLD AUTO: 8.2 % (ref 0–13.4)
NEUTROPHILS # BLD AUTO: 3.23 K/UL (ref 2–7.15)
NEUTROPHILS NFR BLD: 58.8 % (ref 44–72)
NRBC # BLD AUTO: 0 K/UL
NRBC BLD AUTO-RTO: 0 /100 WBC
PLATELET # BLD AUTO: 217 K/UL (ref 164–446)
PMV BLD AUTO: 10.3 FL (ref 9–12.9)
POTASSIUM SERPL-SCNC: 3.7 MMOL/L (ref 3.6–5.5)
RBC # BLD AUTO: 4.09 M/UL (ref 4.2–5.4)
SODIUM SERPL-SCNC: 138 MMOL/L (ref 135–145)
WBC # BLD AUTO: 5.5 K/UL (ref 4.8–10.8)

## 2017-08-09 PROCEDURE — 96375 TX/PRO/DX INJ NEW DRUG ADDON: CPT

## 2017-08-09 PROCEDURE — 85025 COMPLETE CBC W/AUTO DIFF WBC: CPT

## 2017-08-09 PROCEDURE — 80048 BASIC METABOLIC PNL TOTAL CA: CPT

## 2017-08-09 PROCEDURE — 700111 HCHG RX REV CODE 636 W/ 250 OVERRIDE (IP): Performed by: INTERNAL MEDICINE

## 2017-08-09 PROCEDURE — 99285 EMERGENCY DEPT VISIT HI MDM: CPT

## 2017-08-09 PROCEDURE — 74020 DX-ABDOMEN-2 VIEWS: CPT

## 2017-08-09 PROCEDURE — 96374 THER/PROPH/DIAG INJ IV PUSH: CPT

## 2017-08-09 PROCEDURE — 700105 HCHG RX REV CODE 258: Performed by: INTERNAL MEDICINE

## 2017-08-09 RX ORDER — ONDANSETRON 2 MG/ML
4 INJECTION INTRAMUSCULAR; INTRAVENOUS ONCE
Status: COMPLETED | OUTPATIENT
Start: 2017-08-09 | End: 2017-08-09

## 2017-08-09 RX ORDER — SODIUM CHLORIDE 9 MG/ML
INJECTION, SOLUTION INTRAVENOUS CONTINUOUS
Status: DISCONTINUED | OUTPATIENT
Start: 2017-08-09 | End: 2017-08-10 | Stop reason: HOSPADM

## 2017-08-09 RX ADMIN — ONDANSETRON 4 MG: 2 INJECTION INTRAMUSCULAR; INTRAVENOUS at 21:57

## 2017-08-09 RX ADMIN — HYDROMORPHONE HYDROCHLORIDE 1 MG: 1 INJECTION, SOLUTION INTRAMUSCULAR; INTRAVENOUS; SUBCUTANEOUS at 21:57

## 2017-08-09 RX ADMIN — SODIUM CHLORIDE: 9 INJECTION, SOLUTION INTRAVENOUS at 21:57

## 2017-08-09 NOTE — ED AVS SNAPSHOT
8/10/2017    Jaqueline Morocho  [address] [place] [id number]    Dear Jaqueline:    Critical access hospital wants to ensure your discharge home is safe and you or your loved ones have had all of your questions answered regarding your care after you leave the hospital.    Below is a list of resources and contact information should you have any questions regarding your hospital stay, follow-up instructions, or active medical symptoms.    Questions or Concerns Regarding… Contact   Medical Questions Related to Your Discharge  (7 days a week, 8am-5pm) Contact a Nurse Care Coordinator   [phone]   Medical Questions Not Related to Your Discharge  (24 hours a day / 7 days a week)  Contact the Nurse Health Line   [phone]    Medications or Discharge Instructions Refer to your discharge packet   or contact your [facility] Primary Care Provider   [phone]   Follow-up Appointment(s) Schedule your appointment via [employer]   or contact Scheduling [phone]   Billing Review your statement via [employer]  or contact Billing [phone]   Medical Records Review your records via [employer]   or contact Medical Records [phone]     You may receive a telephone call within two days of discharge. This call is to make certain you understand your discharge instructions and have the opportunity to have any questions answered. You can also easily access your medical information, test results and upcoming appointments via the [employer] free online health management tool. You can learn more and sign up at [email or web address]. For assistance setting up your [employer] account, please call [phone].    Once again, we want to ensure your discharge home is safe and that you have a clear understanding of any next steps in your care. If you have any questions or concerns, please do not hesitate to contact us, we are here for you. Thank you for choosing [facility] for your healthcare needs.    Sincerely,    Your [facility] Healthcare Team

## 2017-08-09 NOTE — ED AVS SNAPSHOT
Home Care Instructions                                                                                                                Jaqueline Morocho   MRN: [id number]    Department:  [facility], Emergency Dept   Date of Visit:  8/9/2017            [facility], Emergency Dept    [address]    [place] [address]    Phone:  [phone]      You were seen by     Lauri Barajas M.D.      Your Diagnosis Was     Left upper quadrant pain     R10.12       These are the medications you received during your hospitalization from 08/09/2017 1753 to 08/10/2017 0150     Date/Time Order Dose Route Action    08/09/2017 2157 NS infusion   Intravenous New Bag    08/09/2017 2157 ondansetron (ZOFRAN) syringe/vial injection 4 mg 4 mg Intravenous Given    08/09/2017 2157 HYDROmorphone (DILAUDID) injection 1 mg 1 mg Intravenous Given    08/10/2017 0028 HYDROmorphone (DILAUDID) injection 1 mg 1 mg Intravenous Given    08/10/2017 0059 iohexol (OMNIPAQUE) 350 mg/mL 100 mL Intravenous Given      Follow-up Information     1. Follow up with Bill Chery M.D.. Schedule an appointment as soon as possible for a visit in 1 week.    Specialties:  Surgery, Radiology    Contact information    [address]  [address]  [facility] [address] [phone]        Medication Information     Review all of your home medications and newly ordered medications with your primary doctor and/or pharmacist as soon as possible. Follow medication instructions as directed by your doctor and/or pharmacist.     Please keep your complete medication list with you and share with your physician. Update the information when medications are discontinued, doses are changed, or new medications (including over-the-counter products) are added; and carry medication information at all times in the event of emergency situations.               Medication List      START taking these medications        Instructions    Morning Afternoon Evening  Bedtime    hydrocodone-acetaminophen 5-325 MG Tabs per tablet   Commonly known as:  NORCO        Take 1-2 Tabs by mouth every four hours as needed (mild pain).   Dose:  1-2 Tab                        omeprazole 40 MG delayed-release capsule   Commonly known as:  PRILOSEC        Take 1 Cap by mouth every day.   Dose:  40 mg                        ondansetron 4 MG Tbdp   Commonly known as:  ZOFRAN ODT        Take 1 Tab by mouth every 8 hours as needed for Nausea/Vomiting.   Dose:  4 mg                          ASK your doctor about these medications        Instructions    Morning Afternoon Evening Bedtime    * atenolol 50 MG Tabs   Commonly known as:  TENORMIN        Take 1 Tab by mouth every day.   Dose:  50 mg                        * atenolol 50 MG Tabs   Commonly known as:  TENORMIN        Take 1 Tab by mouth every day.   Dose:  50 mg                        * citalopram 40 MG Tabs   Commonly known as:  CELEXA        Take 1 Tab by mouth every day.   Dose:  40 mg                        * citalopram 40 MG Tabs   Commonly known as:  CELEXA        Take 1 Tab by mouth every day.   Dose:  40 mg                        guaifenesin-codeine 100-10 MG/5ML syrup   Commonly known as:  TUSSI-ORGANIDIN NR        Take 10 mL by mouth 4 times a day as needed for Cough.   Dose:  10 mL                        ibuprofen 200 MG Tabs   Commonly known as:  MOTRIN        Take 200 mg by mouth every 6 hours as needed.   Dose:  200 mg                        * lisinopril-hydrochlorothiazide 10-12.5 MG per tablet   Commonly known as:  PRINZIDE, ZESTORETIC        Take 1 Tab by mouth every day.   Dose:  1 Tab                        * lisinopril-hydrochlorothiazide 10-12.5 MG per tablet   Commonly known as:  PRINZIDE, ZESTORETIC        Take 1 Tab by mouth every day.   Dose:  1 Tab                        MethylPREDNISolone 4 MG Tbpk   Commonly known as:  MEDROL DOSEPAK        Use as directed                        * Notice:  This list has 6  medication(s) that are the same as other medications prescribed for you. Read the directions carefully, and ask your doctor or other care provider to review them with you.         Where to Get Your Medications      You can get these medications from any pharmacy     Bring a paper prescription for each of these medications    - hydrocodone-acetaminophen 5-325 MG Tabs per tablet  - omeprazole 40 MG delayed-release capsule  - ondansetron 4 MG Tbdp            Procedures and tests performed during your visit     Procedure/Test Number of Times Performed    BASIC METABOLIC PANEL 1    CBC WITH DIFFERENTIAL 1    CONSENT FOR CONTRAST INJECTION 2    CT-ABDOMEN-PELVIS WITH 1    AP-[id number]-8 VIEWS 1    ESTIMATED GFR 1        Discharge Instructions         Abdominal Pain, Extended Version   Belly Pain, Stomach Pain    Take a clear fluid diet 12 hours and use hydrocodone for pain.   Return  for worsening pain, pain that is worse with movement, uncontrolled vomiting, new fever, bleeding or ill appearance. Follow-up with Dr. Chery.    You had an elevated or high normal blood pressure reading today.  This does not necessarily mean you have hypertension.  Please followup with your/a primary physician for comprehensive blood pressure evaluation.  BP Readings from Last 3 Encounters:   08/09/17 144/81   06/21/17 165/95   05/22/17 136/95         Your exam may not have shown the exact reason you have abdominal pain.  Since there are many different causes of abdominal pain, another checkup and more tests may be needed. One of the many possible causes of abdominal pain in any person who has not had their appendix removed is Acute Appendicitis.  Appendicitis is often a very difficult to diagnosis. Normal blood tests, urine tests, and even ultrasound and CT can not ensure there is not an early appendicitis. Sometimes only the changes which occur over time will allow appendicitis and other causes of abdominal pain to be determined.  Because of  this, it is important you follow all of the instructions below.                                                                                                Home care instructions  Ø Rest.  Ø Do not eat solid food until your pain is gone.  Ø While You Have Pain:  Stay on a clear liquid diet.  A clear liquid is one you can see through (water, weak tea, broth or bouillon, ginger ale, Jell-o, Giuseppe-Aid, Gatorade, apple juice, popsicles or ice chips).   Ø When Your Pain is Gone:  Start a light diet (dry toast, crackers, applesauce, white rice, bananas, broth or bouillon) and increase the diet slowly, as long as it does not bother you.  No dairy products (including cheese and eggs) and no spicy, fatty, fried or high fiber foods.  Ø No alcohol, caffeine or cigarettes.  Ø Take your regular medicines unless the caregiver told you not to.  Ø Take any prescribed medicine as directed.  Ø Do not take medicine containing aspirin, ibuprofen (Advil® / Motrin® ), naprosyn/naproxen (Aleve®) or ketoprofen (Orudis® ) unless told to by your own caregiver.    seek immediate medical attention if :  Ø Your pain is not gone in 8-12 hours.  Ø Your pain becomes worse, changes location or feels different.  Ø You have a fever or shaking chills.  Ø You keep throwing up or cannot drink liquids.   Ø You see blood when you throw up or see blood in your bowel movements.    Ø Your bowel movements become dark or black.  Ø You move your bowels frequently.  Ø Your bowel movements stop (become blocked) or you cannot pass gas.  Ø You have bloody, frequent or painful urination (“passing water”).  Ø Your skin or the whites of your eyes look yellow.  Ø Your stomach becomes bloated or bigger.  Ø You notice bleeding or discharge from your vagina.  Ø You have dizziness or fainting.  Ø You have chest or back pain.   Ø Anything else worries you.  Ø You become short of breath.    If you have questions or concerns, please call your caregiver.     Adapted from  ©2001  [employer] Aftercare Instruction Sheets  Last reviewed July 12, 2005, Layout [employer], creator of [employer] Patient Information System.    ExitCare® Patient Information ©2007 [employer].              Patient Information     Patient Information    Following emergency treatment: all patient requiring follow-up care must return either to a private physician or a clinic if your condition worsens before you are able to obtain further medical attention, please return to the emergency room.     Billing Information    At [facility], we work to make the billing process streamlined for our patients.  Our Representatives are here to answer any questions you may have regarding your hospital bill.  If you have insurance coverage and have supplied your insurance information to us, we will submit a claim to your insurer on your behalf.  Should you have any questions regarding your bill, we can be reached online or by phone as follows:  Online: You are able pay your bills online or live chat with our representatives about any billing questions you may have. We are here to help Monday - Friday from 8:00am to 7:30pm and 9:00am - 12:00pm on Saturdays.  Please visit [email or web address]  for more information.   Phone:  [phone] or [phone]    Please note that your emergency physician, surgeon, pathologist, radiologist, anesthesiologist, and other specialists are not employed by [facility] and will therefore bill separately for their services.  Please contact them directly for any questions concerning their bills at the numbers below:     Emergency Physician Services:  [phone]  [facility]:  [phone]  [facility]:  [phone]  [facility]:  [phone]    1. Your final bill may vary from the amount quoted upon discharge if all procedures are not complete at that time, or if your doctor  has additional procedures of which we are not aware. You will receive an additional bill if you return to the Emergency Department at [facility] for suture removal regardless of the facility of which the sutures were placed.     2. Please arrange for settlement of this account at the emergency registration.    3. All self-pay accounts are due in full at the time of treatment.  If you are unable to meet this obligation then payment is expected within 4-5 days.     4. If you have had radiology studies (CT, X-ray, Ultrasound, MRI), you have received a preliminary result during your emergency department visit. Please contact the radiology department [phone] to receive a copy of your final result. Please discuss the Final result with your primary physician or with the follow up physician provided.     Crisis Hotline:  [place] Crisis Hotline:  4-517-PJCKLWW or 1-287.268.4385  [place] Crisis Hotline:    1-680.158.2849 or 013-676-2023         ED Discharge Follow Up Questions    1. In order to provide you with very good care, we would like to follow up with a phone call in the next few days.  May we have your permission to contact you?     YES /  NO    2. What is the best phone number to call you? (       )_____-__________    3. What is the best time to call you?      Morning  /  Afternoon  /  Evening                   Patient Signature:  ____________________________________________________________    Date:  ____________________________________________________________

## 2017-08-09 NOTE — ED AVS SNAPSHOT
[email or web address] Access Code: Activation code not generated  Current MyRegistry.com Status: Active    Mement[employer]art  A secure, online tool to manage your health information     [employer]’s [email or web address]® is a secure, online tool that connects you to your personalized health information from the privacy of your home -- day or night - making it very easy for you to manage your healthcare. Once the activation process is completed, you can even access your medical information using the MyRegistry.com victoriano, which is available for free in the Apple Victoriano store or Google Play store.     MyRegistry.com provides the following levels of access (as shown below):   My Chart Features   [facility] Primary Care Doctor [facility]  Specialists [facility]  Urgent  Care Non-[facility]  Primary Care  Doctor   Email your healthcare team securely and privately 24/7 X X X X   Manage appointments: schedule your next appointment; view details of past/upcoming appointments X      Request prescription refills. X      View recent personal medical records, including lab and immunizations X X X X   View health record, including health history, allergies, medications X X X X   Read reports about your outpatient visits, procedures, consult and ER notes X X X X   See your discharge summary, which is a recap of your hospital and/or ER visit that includes your diagnosis, lab results, and care plan. X X       How to register for [email or web address]:  1. Go to  [email or web address].  2. Click on the Sign Up Now box, which takes you to the New Member Sign Up page. You will need to provide the following information:  a. Enter your MyRegistry.com Access Code exactly as it appears at the top of this page. (You will not need to use this code after you’ve completed the sign-up process. If you do not sign up before the expiration date, you must request a new code.)   b. Enter your date of birth.   c. Enter your home email address.   d. Click Submit, and follow the next screen’s instructions.  3. Create a MyRegistry.com ID. This will  be your Vitals ([email or web address]) login ID and cannot be changed, so think of one that is secure and easy to remember.  4. Create a Vitals ([email or web address]) password. You can change your password at any time.  5. Enter your Password Reset Question and Answer. This can be used at a later time if you forget your password.   6. Enter your e-mail address. This allows you to receive e-mail notifications when new information is available in Vitals ([email or web address]).  7. Click Sign Up. You can now view your health information.    For assistance activating your Vitals ([email or web address]) account, call [phone]

## 2017-08-10 ENCOUNTER — APPOINTMENT (OUTPATIENT)
Dept: RADIOLOGY | Facility: MEDICAL CENTER | Age: 47
End: 2017-08-10
Attending: EMERGENCY MEDICINE
Payer: MEDICAID

## 2017-08-10 VITALS
BODY MASS INDEX: 38.27 KG/M2 | HEART RATE: 74 BPM | WEIGHT: 238.1 LBS | HEIGHT: 66 IN | RESPIRATION RATE: 17 BRPM | TEMPERATURE: 98.1 F | SYSTOLIC BLOOD PRESSURE: 144 MMHG | OXYGEN SATURATION: 96 % | DIASTOLIC BLOOD PRESSURE: 81 MMHG

## 2017-08-10 PROCEDURE — 74177 CT ABD & PELVIS W/CONTRAST: CPT

## 2017-08-10 PROCEDURE — 96376 TX/PRO/DX INJ SAME DRUG ADON: CPT

## 2017-08-10 PROCEDURE — 700117 HCHG RX CONTRAST REV CODE 255: Performed by: EMERGENCY MEDICINE

## 2017-08-10 PROCEDURE — 700111 HCHG RX REV CODE 636 W/ 250 OVERRIDE (IP): Performed by: EMERGENCY MEDICINE

## 2017-08-10 RX ORDER — HYDROCODONE BITARTRATE AND ACETAMINOPHEN 5; 325 MG/1; MG/1
1-2 TABLET ORAL EVERY 4 HOURS PRN
Qty: 10 TAB | Refills: 0 | Status: SHIPPED | OUTPATIENT
Start: 2017-08-10

## 2017-08-10 RX ORDER — ONDANSETRON 4 MG/1
4 TABLET, ORALLY DISINTEGRATING ORAL EVERY 8 HOURS PRN
Qty: 10 TAB | Refills: 0 | Status: SHIPPED | OUTPATIENT
Start: 2017-08-10

## 2017-08-10 RX ORDER — OMEPRAZOLE 40 MG/1
40 CAPSULE, DELAYED RELEASE ORAL DAILY
Qty: 30 CAP | Refills: 0 | Status: SHIPPED | OUTPATIENT
Start: 2017-08-10

## 2017-08-10 RX ADMIN — IOHEXOL 100 ML: 350 INJECTION, SOLUTION INTRAVENOUS at 00:59

## 2017-08-10 RX ADMIN — HYDROMORPHONE HYDROCHLORIDE 1 MG: 1 INJECTION, SOLUTION INTRAMUSCULAR; INTRAVENOUS; SUBCUTANEOUS at 00:28

## 2017-08-10 NOTE — ED NOTES
Report received from RN. Assumed care of pt. Pt returned from CT scan. Pt c/o pain at IV site, fluids stopped to monitor.

## 2017-08-10 NOTE — DISCHARGE INSTRUCTIONS
Abdominal Pain, Extended Version   Belly Pain, Stomach Pain    Take a clear fluid diet 12 hours and use hydrocodone for pain.   Return  for worsening pain, pain that is worse with movement, uncontrolled vomiting, new fever, bleeding or ill appearance. Follow-up with Dr. Chery.    You had an elevated or high normal blood pressure reading today.  This does not necessarily mean you have hypertension.  Please followup with your/a primary physician for comprehensive blood pressure evaluation.  BP Readings from Last 3 Encounters:   08/09/17 144/81   06/21/17 165/95   05/22/17 136/95         Your exam may not have shown the exact reason you have abdominal pain.  Since there are many different causes of abdominal pain, another checkup and more tests may be needed. One of the many possible causes of abdominal pain in any person who has not had their appendix removed is Acute Appendicitis.  Appendicitis is often a very difficult to diagnosis. Normal blood tests, urine tests, and even ultrasound and CT can not ensure there is not an early appendicitis. Sometimes only the changes which occur over time will allow appendicitis and other causes of abdominal pain to be determined.  Because of this, it is important you follow all of the instructions below.                                                                                                Home care instructions  Ø Rest.  Ø Do not eat solid food until your pain is gone.  Ø While You Have Pain:  Stay on a clear liquid diet.  A clear liquid is one you can see through (water, weak tea, broth or bouillon, ginger ale, Jell-o, Giuseppe-Aid, Gatorade, apple juice, popsicles or ice chips).   Ø When Your Pain is Gone:  Start a light diet (dry toast, crackers, applesauce, white rice, bananas, broth or bouillon) and increase the diet slowly, as long as it does not bother you.  No dairy products (including cheese and eggs) and no spicy, fatty, fried or high fiber foods.  Ø No alcohol,  caffeine or cigarettes.  Ø Take your regular medicines unless the caregiver told you not to.  Ø Take any prescribed medicine as directed.  Ø Do not take medicine containing aspirin, ibuprofen (Advil® / Motrin® ), naprosyn/naproxen (Aleve®) or ketoprofen (Orudis® ) unless told to by your own caregiver.    seek immediate medical attention if :  Ø Your pain is not gone in 8-12 hours.  Ø Your pain becomes worse, changes location or feels different.  Ø You have a fever or shaking chills.  Ø You keep throwing up or cannot drink liquids.   Ø You see blood when you throw up or see blood in your bowel movements.    Ø Your bowel movements become dark or black.  Ø You move your bowels frequently.  Ø Your bowel movements stop (become blocked) or you cannot pass gas.  Ø You have bloody, frequent or painful urination (“passing water”).  Ø Your skin or the whites of your eyes look yellow.  Ø Your stomach becomes bloated or bigger.  Ø You notice bleeding or discharge from your vagina.  Ø You have dizziness or fainting.  Ø You have chest or back pain.   Ø Anything else worries you.  Ø You become short of breath.    If you have questions or concerns, please call your caregiver.     Adapted from ©2001  [employer] Aftercare Instruction Sheets  Last reviewed July 12, 2005, Layout [employer], creator of [employer] Patient Information System.    ExitCare® Patient Information ©2007 [employer].

## 2017-08-10 NOTE — ED NOTES
Pt with bulging in LUQ, BS in all quads, states that last BM today, pt A&O x4, ambulatory to room with strong steady gait in NAD at this time.

## 2017-08-10 NOTE — ED NOTES
Discharge orders received, IV and monitor discontinued, instructions and education given, follow-up discussed, prescriptions provided, pt verbalized understanding.

## 2017-08-10 NOTE — ED PROVIDER NOTES
ED Provider Note    Scribed for Lauri Barajas M.D. by Yolanda Lizama. 8/9/2017  8:53 PM    Primary care provider: Stewart Pena M.D.  Means of arrival: Walk-in  History obtained from: Patient  History limited by: None    CHIEF COMPLAINT  Chief Complaint   Patient presents with   • Hernia     Pt noticed some swelling in her left upper quad of her abd. Having N/V since yesterday. Pt has hx of about 5 hernias in the past       HPI  Jaqueline Morocho is a 47 y.o. female with a history of recurrent abdominal hernias who presents for a left sided, painful bulge. She states that she was lifting a heavy object 3-4 days ago and then began to notice the bulge. The pain is currently a 7/10 in severity and is similar to previous incidences of hernias (repaired by Dr.Robert Chery). The patient endorses chills, nausea, and four episodes of vomiting and some chills since this bulge was noticed. Her last bowel movement was earlier today without abnormality. The patient denies hematemesis, diarrhea, or fever.    REVIEW OF SYSTEMS  Pertinent positives include: history of hernia repair, bulge on left side, nausea, vomiting, chills, abdominal pain.  Pertinent negatives include: hematemesis, diarrhea, fever.  10+ systems reviewed and negative.    C    PAST MEDICAL HISTORY  Past Medical History   Diagnosis Date   • Psychiatric disorder      anxiety   • Hypertension        FAMILY HISTORY  No pertinent family history    SOCIAL HISTORY  Social History   Substance Use Topics   • Smoking status: Never Smoker    • Smokeless tobacco: None   • Alcohol Use: No     History   Drug Use No       SURGICAL HISTORY  Several repeated hernia repairs - Dr Bill Chery    CURRENT MEDICATIONS  Current Facility-Administered Medications   Medication Dose Route Frequency Provider Last Rate Last Dose   • NS infusion   Intravenous Continuous Renetta Cisse M.D.   Stopped at 08/10/17 0111     Current Outpatient Prescriptions   Medication Sig Dispense Refill   •  "guaifenesin-codeine (TUSSI-ORGANIDIN NR) 100-10 MG/5ML syrup Take 10 mL by mouth 4 times a day as needed for Cough. 120 mL 0   • citalopram (CELEXA) 40 MG Tab Take 1 Tab by mouth every day. 30 Tab 0   • lisinopril-hydrochlorothiazide (PRINZIDE, ZESTORETIC) 10-12.5 MG per tablet Take 1 Tab by mouth every day. 30 Tab 0   • MethylPREDNISolone (MEDROL DOSEPAK) 4 MG Tablet Therapy Pack Use as directed 1 Kit 0   • hydrocodone-acetaminophen (NORCO) 5-325 MG Tab per tablet Take 1-2 Tabs by mouth every four hours as needed. 13 Tab 0   • ondansetron (ZOFRAN ODT) 4 MG TABLET DISPERSIBLE Take 1 Tab by mouth every 8 hours as needed for Nausea/Vomiting. 10 Tab 0   • citalopram (CELEXA) 40 MG Tab Take 1 Tab by mouth every day. 30 Tab 0   • atenolol (TENORMIN) 50 MG Tab Take 1 Tab by mouth every day. 30 Tab 0   • hydrocodone-acetaminophen (NORCO) 5-325 MG Tab per tablet Take 1-2 Tabs by mouth every 6 hours as needed. 10 Tab 0   • atenolol (TENORMIN) 50 MG Tab Take 1 Tab by mouth every day. 30 Tab 0   • lisinopril-hydrochlorothiazide (PRINZIDE, ZESTORETIC) 10-12.5 MG per tablet Take 1 Tab by mouth every day. 30 Tab 0   • ibuprofen (MOTRIN) 200 MG Tab Take 200 mg by mouth every 6 hours as needed.         ALLERGIES  Allergies   Allergen Reactions   • Augmentin    • Bactrim [Sulfamethoxazole W-Trimethoprim]    • Cephalosporins    • Demerol    • Fentanyl    • Morphine    • Sulfa Drugs        PHYSICAL EXAM  VITAL SIGNS: /81 mmHg  Pulse 66  Temp(Src) 36.7 °C (98.1 °F)  Resp 16  Ht 1.676 m (5' 6\")  Wt 108 kg (238 lb 1.6 oz)  BMI 38.45 kg/m2  SpO2 98%  Reviewed and Normal  Constitutional: Well developed, Well nourished.  HENT: Normocephalic, atraumatic, bilateral external ears normal, oropharynx moist, No exudates or erythema.   Eyes: PERRLA, conjunctiva pink, no scleral icterus.   Cardiovascular: Regular rate and rhythm. No murmurs, rubs or gallops.   Respiratory: Lungs clear to auscultation bilaterally. No wheezes, rales, or " rhonchi.   Gastrointestinal:  Abdomen soft, non distended. Bulge left subcostal that was tender to palpation and was nonreducible. Well healed midline surgical scar. Active bowel sounds. Voluntary guarding  Skin: No erythema, no rash.   Genitourinary: No costovertebral angle tenderness.   Neurologic: Alert & oriented x 3, cranial nerves 2-12 intact by passive exam.  No focal deficit noted.  Psychiatric: Affect normal, Judgment normal, Mood normal.     DIFFERENTIAL DIAGNOSIS:  Hernia, Obstruction, obstructed hernia, strangulated hernia.    RADIOLOGY/PROCEDURES  CT-ABDOMEN-PELVIS WITH   Final Result      1.  Ventral hernia containing a couple of short segments of small bowel without more proximal bowel dilatation to suggest obstruction.      2.  Small crescentic fluid collection anterior to the hernia, similar to the prior exam.      3.  Right renal scarring.      4.  Hepatic steatosis.      [id number] VIEWS   Final Result      No acute findings.        LABORATORY:  Results for orders placed or performed during the hospital encounter of 08/09/17   CBC WITH DIFFERENTIAL   Result Value Ref Range    WBC 5.5 4.8 - 10.8 K/uL    RBC 4.09 (L) 4.20 - 5.40 M/uL    Hemoglobin 13.8 12.0 - 16.0 g/dL    Hematocrit 40.6 37.0 - 47.0 %    MCV 99.3 (H) 81.4 - 97.8 fL    MCH 33.7 (H) 27.0 - 33.0 pg    MCHC 34.0 33.6 - 35.0 g/dL    RDW 46.3 35.9 - 50.0 fL    Platelet Count 217 164 - 446 K/uL    MPV 10.3 9.0 - 12.9 fL    Neutrophils-Polys 58.80 44.00 - 72.00 %    Lymphocytes 28.80 22.00 - 41.00 %    Monocytes 8.20 0.00 - 13.40 %    Eosinophils 3.30 0.00 - 6.90 %    Basophils 0.50 0.00 - 1.80 %    Immature Granulocytes 0.40 0.00 - 0.90 %    Nucleated RBC 0.00 /100 WBC    Neutrophils (Absolute) 3.23 2.00 - 7.15 K/uL    Lymphs (Absolute) 1.58 1.00 - 4.80 K/uL    Monos (Absolute) 0.45 0.00 - 0.85 K/uL    Eos (Absolute) 0.18 0.00 - 0.51 K/uL    Baso (Absolute) 0.03 0.00 - 0.12 K/uL    Immature Granulocytes (abs) 0.02 0.00 - 0.11 K/uL     NRBC (Absolute) 0.00 K/uL   BASIC METABOLIC PANEL   Result Value Ref Range    Sodium 138 135 - 145 mmol/L    Potassium 3.7 3.6 - 5.5 mmol/L    Chloride 104 96 - 112 mmol/L    Co2 24 20 - 33 mmol/L    Glucose 96 65 - 99 mg/dL    Bun 16 8 - 22 mg/dL    Creatinine 0.73 0.50 - 1.40 mg/dL    Calcium 9.3 8.5 - 10.5 mg/dL    Anion Gap 10.0 0.0 - 11.9   ESTIMATED GFR   Result Value Ref Range    GFR If African American >60 >60 mL/min/1.73 m 2    GFR If Non African American >60 >60 mL/min/1.73 m 2     INTERVENTIONS:  Medications   NS infusion ( Intravenous Stopped 8/10/17 0111)   ondansetron (ZOFRAN) syringe/vial injection 4 mg (4 mg Intravenous Given 8/9/17 2157)   HYDROmorphone (DILAUDID) injection 1 mg (1 mg Intravenous Given 8/9/17 2157)   HYDROmorphone (DILAUDID) injection 1 mg (1 mg Intravenous Given 8/10/17 0028)   iohexol (OMNIPAQUE) 350 mg/mL (100 mL Intravenous Given 8/10/17 0059)   .    COURSE & MEDICAL DECISION MAKING    8:53 PM - Patient seen and examined at bedside. Patient will be treated with IV fluids secondary to dehydration, Zofran 4mg IV, Dilaudid 1mg IV for her symptoms. Ordered Abdomen X-ray, BMP, Urinalysis, CBC to evaluate.     9:19 PM I reviewed the patient's past medical records which revealed an abdominal obstruction in April and a normal, recent CT Scan.     12:10 AM Recheck: Patient re-evaluated at beside. Abdominal a xray not suggestive of obstruction or incarcerated herna.  Will do a CT scan of the Abdomen.Patient complained of pain and  requests pain medication at this time.1mg of dilaudid given again.  1.20 AM: CT abdomen showed a ventral hernia. Patient will be discharged on Pain meds and to follow up with surgeon Dr rice as out patient.   Discussed patient's condition and treatment plan. Patient's lab and radiology results discussed. The patient understood and is in agreement.     PLAN:  Discharge Medication List as of 8/10/2017  1:50 AM      START taking these medications    Details    omeprazole (PRILOSEC) 40 MG delayed-release capsule Take 1 Cap by mouth every day., Disp-30 Cap, R-0, Print Rx Paper      hydrocodone-acetaminophen (NORCO) 5-325 MG Tab per tablet Take 1-2 Tabs by mouth every four hours as needed (mild pain)., Disp-10 Tab, R-0, Print Rx Paper      ondansetron (ZOFRAN ODT) 4 MG TABLET DISPERSIBLE Take 1 Tab by mouth every 8 hours as needed for Nausea/Vomiting., Disp-10 Tab, R-0, Print Rx Paper          prescription monitoring accessed   Home on pain meds and to see Surgeon, Dr Chery as out patient     Bill Chery M.D.  [address] [facility] [place]   [facility] [address]  [facility] [address]  [phone]    Schedule an appointment as soon as possible for a visit in 1 week        CONDITION: Stable on discharge    FINAL IMPRESSION  Hernia    I independently evaluated the patient and repeated the important components of the history and physical. I discussed the management with the resident. I have reviewed and agree with the pertinent clinical information above including history, exam, study findings, and recommendations. I saw this patient in conjunction with .     This patient presents with abdominal pain and a bulge in the left upper quadrant. She's also had vomiting. She has a ventral hernia but not in the area of her pain. There is no evidence of bowel obstruction. There is no evidence of other acute abdominal process. Patient may have a viral syndrome or gastritis.    Electronically signed by: Yolanda Lizama, 8/9/2017 8:53 PM

## 2017-09-04 ENCOUNTER — HOSPITAL ENCOUNTER (EMERGENCY)
Facility: MEDICAL CENTER | Age: 47
End: 2017-09-04
Attending: EMERGENCY MEDICINE
Payer: MEDICAID

## 2017-09-04 ENCOUNTER — APPOINTMENT (OUTPATIENT)
Dept: RADIOLOGY | Facility: MEDICAL CENTER | Age: 47
End: 2017-09-04
Attending: EMERGENCY MEDICINE
Payer: MEDICAID

## 2017-09-04 VITALS
OXYGEN SATURATION: 94 % | DIASTOLIC BLOOD PRESSURE: 78 MMHG | RESPIRATION RATE: 16 BRPM | SYSTOLIC BLOOD PRESSURE: 132 MMHG | HEART RATE: 62 BPM | BODY MASS INDEX: 39.01 KG/M2 | WEIGHT: 242.73 LBS | HEIGHT: 66 IN | TEMPERATURE: 99 F

## 2017-09-04 DIAGNOSIS — K43.9 VENTRAL HERNIA WITHOUT OBSTRUCTION OR GANGRENE: ICD-10-CM

## 2017-09-04 LAB
ALBUMIN SERPL BCP-MCNC: 3.9 G/DL (ref 3.2–4.9)
ALBUMIN/GLOB SERPL: 1.4 G/DL
ALP SERPL-CCNC: 102 U/L (ref 30–99)
ALT SERPL-CCNC: 93 U/L (ref 2–50)
AMPHET UR QL SCN: NEGATIVE
ANION GAP SERPL CALC-SCNC: 8 MMOL/L (ref 0–11.9)
APPEARANCE UR: CLEAR
AST SERPL-CCNC: 69 U/L (ref 12–45)
BACTERIA #/AREA URNS HPF: ABNORMAL /HPF
BARBITURATES UR QL SCN: NEGATIVE
BASOPHILS # BLD AUTO: 1 % (ref 0–1.8)
BASOPHILS # BLD: 0.06 K/UL (ref 0–0.12)
BENZODIAZ UR QL SCN: NEGATIVE
BILIRUB SERPL-MCNC: 0.3 MG/DL (ref 0.1–1.5)
BILIRUB UR QL STRIP.AUTO: NEGATIVE
BUN SERPL-MCNC: 16 MG/DL (ref 8–22)
BZE UR QL SCN: NEGATIVE
CALCIUM SERPL-MCNC: 9.3 MG/DL (ref 8.5–10.5)
CANNABINOIDS UR QL SCN: NEGATIVE
CHLORIDE SERPL-SCNC: 105 MMOL/L (ref 96–112)
CO2 SERPL-SCNC: 25 MMOL/L (ref 20–33)
COLOR UR: YELLOW
CREAT SERPL-MCNC: 0.81 MG/DL (ref 0.5–1.4)
CULTURE IF INDICATED INDCX: YES UA CULTURE
EOSINOPHIL # BLD AUTO: 0.15 K/UL (ref 0–0.51)
EOSINOPHIL NFR BLD: 2.6 % (ref 0–6.9)
EPI CELLS #/AREA URNS HPF: ABNORMAL /HPF
ERYTHROCYTE [DISTWIDTH] IN BLOOD BY AUTOMATED COUNT: 47.8 FL (ref 35.9–50)
GFR SERPL CREATININE-BSD FRML MDRD: >60 ML/MIN/1.73 M 2
GLOBULIN SER CALC-MCNC: 2.7 G/DL (ref 1.9–3.5)
GLUCOSE SERPL-MCNC: 103 MG/DL (ref 65–99)
GLUCOSE UR STRIP.AUTO-MCNC: NEGATIVE MG/DL
HCT VFR BLD AUTO: 39.7 % (ref 37–47)
HGB BLD-MCNC: 13.2 G/DL (ref 12–16)
HYALINE CASTS #/AREA URNS LPF: ABNORMAL /LPF
IMM GRANULOCYTES # BLD AUTO: 0.04 K/UL (ref 0–0.11)
IMM GRANULOCYTES NFR BLD AUTO: 0.7 % (ref 0–0.9)
KETONES UR STRIP.AUTO-MCNC: NEGATIVE MG/DL
LEUKOCYTE ESTERASE UR QL STRIP.AUTO: ABNORMAL
LIPASE SERPL-CCNC: 20 U/L (ref 11–82)
LYMPHOCYTES # BLD AUTO: 1.51 K/UL (ref 1–4.8)
LYMPHOCYTES NFR BLD: 25.9 % (ref 22–41)
MCH RBC QN AUTO: 33.1 PG (ref 27–33)
MCHC RBC AUTO-ENTMCNC: 33.2 G/DL (ref 33.6–35)
MCV RBC AUTO: 99.5 FL (ref 81.4–97.8)
METHADONE UR QL SCN: NEGATIVE
MICRO URNS: ABNORMAL
MONOCYTES # BLD AUTO: 0.48 K/UL (ref 0–0.85)
MONOCYTES NFR BLD AUTO: 8.2 % (ref 0–13.4)
NEUTROPHILS # BLD AUTO: 3.6 K/UL (ref 2–7.15)
NEUTROPHILS NFR BLD: 61.6 % (ref 44–72)
NITRITE UR QL STRIP.AUTO: NEGATIVE
NRBC # BLD AUTO: 0 K/UL
NRBC BLD AUTO-RTO: 0 /100 WBC
OPIATES UR QL SCN: NEGATIVE
OXYCODONE UR QL SCN: NEGATIVE
PCP UR QL SCN: NEGATIVE
PH UR STRIP.AUTO: 5.5 [PH]
PLATELET # BLD AUTO: 218 K/UL (ref 164–446)
PMV BLD AUTO: 10.6 FL (ref 9–12.9)
POTASSIUM SERPL-SCNC: 3.7 MMOL/L (ref 3.6–5.5)
PROPOXYPH UR QL SCN: NEGATIVE
PROT SERPL-MCNC: 6.6 G/DL (ref 6–8.2)
PROT UR QL STRIP: NEGATIVE MG/DL
RBC # BLD AUTO: 3.99 M/UL (ref 4.2–5.4)
RBC # URNS HPF: ABNORMAL /HPF
RBC UR QL AUTO: NEGATIVE
SODIUM SERPL-SCNC: 138 MMOL/L (ref 135–145)
SP GR UR STRIP.AUTO: 1.03
UROBILINOGEN UR STRIP.AUTO-MCNC: 1 MG/DL
WBC # BLD AUTO: 5.8 K/UL (ref 4.8–10.8)
WBC #/AREA URNS HPF: ABNORMAL /HPF

## 2017-09-04 PROCEDURE — 74177 CT ABD & PELVIS W/CONTRAST: CPT

## 2017-09-04 PROCEDURE — 83690 ASSAY OF LIPASE: CPT

## 2017-09-04 PROCEDURE — 99285 EMERGENCY DEPT VISIT HI MDM: CPT

## 2017-09-04 PROCEDURE — 80307 DRUG TEST PRSMV CHEM ANLYZR: CPT

## 2017-09-04 PROCEDURE — 80053 COMPREHEN METABOLIC PANEL: CPT

## 2017-09-04 PROCEDURE — 85025 COMPLETE CBC W/AUTO DIFF WBC: CPT

## 2017-09-04 PROCEDURE — 81001 URINALYSIS AUTO W/SCOPE: CPT | Mod: 59

## 2017-09-04 PROCEDURE — 700117 HCHG RX CONTRAST REV CODE 255: Performed by: EMERGENCY MEDICINE

## 2017-09-04 PROCEDURE — 87077 CULTURE AEROBIC IDENTIFY: CPT

## 2017-09-04 PROCEDURE — 700111 HCHG RX REV CODE 636 W/ 250 OVERRIDE (IP): Performed by: EMERGENCY MEDICINE

## 2017-09-04 PROCEDURE — 96374 THER/PROPH/DIAG INJ IV PUSH: CPT | Mod: XU

## 2017-09-04 PROCEDURE — 87086 URINE CULTURE/COLONY COUNT: CPT

## 2017-09-04 PROCEDURE — 87186 SC STD MICRODIL/AGAR DIL: CPT

## 2017-09-04 RX ORDER — ONDANSETRON 4 MG/1
4 TABLET, ORALLY DISINTEGRATING ORAL ONCE
Qty: 10 TAB | Refills: 1 | Status: SHIPPED | OUTPATIENT
Start: 2017-09-04 | End: 2017-09-04

## 2017-09-04 RX ORDER — ONDANSETRON 2 MG/ML
4 INJECTION INTRAMUSCULAR; INTRAVENOUS ONCE
Status: COMPLETED | OUTPATIENT
Start: 2017-09-04 | End: 2017-09-04

## 2017-09-04 RX ORDER — ONDANSETRON 4 MG/1
4 TABLET, ORALLY DISINTEGRATING ORAL ONCE
Qty: 10 TAB | Refills: 0 | Status: SHIPPED | OUTPATIENT
Start: 2017-09-04 | End: 2017-09-04

## 2017-09-04 RX ORDER — TRAMADOL HYDROCHLORIDE 50 MG/1
50-100 TABLET ORAL EVERY 6 HOURS PRN
Qty: 14 TAB | Refills: 0 | Status: SHIPPED | OUTPATIENT
Start: 2017-09-04

## 2017-09-04 RX ADMIN — ONDANSETRON 4 MG: 2 INJECTION INTRAMUSCULAR; INTRAVENOUS at 18:12

## 2017-09-04 RX ADMIN — IOHEXOL 100 ML: 350 INJECTION, SOLUTION INTRAVENOUS at 19:20

## 2017-09-04 ASSESSMENT — ENCOUNTER SYMPTOMS
BACK PAIN: 1
NAUSEA: 1
DIARRHEA: 1
ABDOMINAL PAIN: 1
VOMITING: 0

## 2017-09-04 ASSESSMENT — PAIN SCALES - GENERAL: PAINLEVEL_OUTOF10: 8

## 2017-09-04 ASSESSMENT — PAIN SCALES - WONG BAKER: WONGBAKER_NUMERICALRESPONSE: HURTS A LITTLE MORE

## 2017-09-04 NOTE — ED NOTES
Pt to triage c/o hernia pain and back pain x 2 days. (+) Nausea. Denies painful urination. Pt advised to return to triage nurse for any changes or concerns.

## 2017-09-04 NOTE — ED NOTES
Patient states 2 day hx of worsening abdominal pain and low back pain. Patient states she has hx of multiple abdominal hernias w/ 11 abd hernia repairs in the past. Patient rates pain 8/10 and is described as burning/stabbing; aggravating factors include palpation, position, and eating. Patient states pain is relieved only by certain positions. Patient states she has had diarrhea and nausea without vomiting.

## 2017-09-05 NOTE — ED NOTES
Patient dc'd to home w/ self. Patient alert and oriented x 4. Patient ambulatory w/ steady gait. Patient verbalizes understanding of dc instructions, follow up care, and prescriptions x 2. Patient denies questions upon dc.

## 2017-09-05 NOTE — DISCHARGE INSTRUCTIONS
Hernia  A hernia happens when an organ or tissue inside your body pushes out through a weak spot in the belly (abdomen).  HOME CARE  · Avoid stretching or overusing (straining) the muscles near the hernia.  · Do not lift anything heavier than 10 lb (4.5 kg).  · Use the muscles in your leg when you lift something up. Do not use the muscles in your back.  · When you cough, try to cough gently.  · Eat a diet that has a lot of fiber. Eat lots of fruits and vegetables.  · Drink enough fluids to keep your pee (urine) clear or pale yellow. Try to drink 6-8 glasses of water a day.  · Take medicines to make your poop soft (stool softeners) as told by your doctor.  · Lose weight, if you are overweight.  · Do not use any tobacco products, including cigarettes, chewing tobacco, or electronic cigarettes. If you need help quitting, ask your doctor.  · Keep all follow-up visits as told by your doctor. This is important.  GET HELP IF:  · The skin by the hernia gets puffy (swollen) or red.  · The hernia is painful.  GET HELP RIGHT AWAY IF:  · You have a fever.  · You have belly pain that is getting worse.  · You feel sick to your stomach (nauseous) or you throw up (vomit).  · You cannot push the hernia back in place by gently pressing on it while you are lying down.  · The hernia:  ¨ Changes in shape or size.  ¨ Is stuck outside your belly.  ¨ Changes color.  ¨ Feels hard or tender.     This information is not intended to replace advice given to you by your health care provider. Make sure you discuss any questions you have with your health care provider.     Document Released: 06/07/2011 Document Revised: 01/08/2016 Document Reviewed: 10/28/2015  [employer] Interactive Patient Education ©2016 [employer]    Urinary Tract Infection  Urinary tract infections (UTIs) can develop anywhere along your urinary tract. Your urinary tract is your body's drainage system for removing wastes and extra water. Your urinary tract includes two  kidneys, two ureters, a bladder, and a urethra. Your kidneys are a pair of bean-shaped organs. Each kidney is about the size of your fist. They are located below your ribs, one on each side of your spine.  CAUSES  Infections are caused by microbes, which are microscopic organisms, including fungi, viruses, and bacteria. These organisms are so small that they can only be seen through a microscope. Bacteria are the microbes that most commonly cause UTIs.  SYMPTOMS   Symptoms of UTIs may vary by age and gender of the patient and by the location of the infection. Symptoms in young women typically include a frequent and intense urge to urinate and a painful, burning feeling in the bladder or urethra during urination. Older women and men are more likely to be tired, shaky, and weak and have muscle aches and abdominal pain. A fever may mean the infection is in your kidneys. Other symptoms of a kidney infection include pain in your back or sides below the ribs, nausea, and vomiting.  DIAGNOSIS  To diagnose a UTI, your caregiver will ask you about your symptoms. Your caregiver also will ask to provide a urine sample. The urine sample will be tested for bacteria and white blood cells. White blood cells are made by your body to help fight infection.  TREATMENT   Typically, UTIs can be treated with medication. Because most UTIs are caused by a bacterial infection, they usually can be treated with the use of antibiotics. The choice of antibiotic and length of treatment depend on your symptoms and the type of bacteria causing your infection.  HOME CARE INSTRUCTIONS  · If you were prescribed antibiotics, take them exactly as your caregiver instructs you. Finish the medication even if you feel better after you have only taken some of the medication.  · Drink enough water and fluids to keep your urine clear or pale yellow.  · Avoid caffeine, tea, and carbonated beverages. They tend to irritate your bladder.  · Empty your bladder  often. Avoid holding urine for long periods of time.  · Empty your bladder before and after sexual intercourse.  · After a bowel movement, women should cleanse from front to back. Use each tissue only once.  SEEK MEDICAL CARE IF:   · You have back pain.  · You develop a fever.  · Your symptoms do not begin to resolve within 3 days.  SEEK IMMEDIATE MEDICAL CARE IF:   · You have severe back pain or lower abdominal pain.  · You develop chills.  · You have nausea or vomiting.  · You have continued burning or discomfort with urination.  MAKE SURE YOU:   · Understand these instructions.  · Will watch your condition.  · Will get help right away if you are not doing well or get worse.     This information is not intended to replace advice given to you by your health care provider. Make sure you discuss any questions you have with your health care provider.    Return if fever or difficulty with medication. Repeat urinalysis in 7-10 days. Contact general surgery to arrange for follow-up tomorrow.     Document Released: 09/27/2006 Document Revised: 01/08/2016 Document Reviewed: 01/25/2013  [employer] Interactive Patient Education ©2016 [employer]

## 2017-09-05 NOTE — ED PROVIDER NOTES
"ED Provider Note    Scribed for Gumaro Luo M.D. by Dmaien Rod. 9/4/2017, 5:21 PM.    Primary care provider: Jose Kirk D.N.P.  Means of arrival: Walk in  History obtained from: Patient  History limited by: None    CHIEF COMPLAINT  Chief Complaint   Patient presents with   • Hernia   • Back Pain       MORRO Morocho is a 47 y.o. female with a history of abdominal hernias who presents to the Emergency Department complaining of abdominal pain from a hernia worsened a couple of days ago. Patient states the pain is excruciating in severity. The pain is exacerbated with palpation and eating. She describes the pain as a \"burning, ripping\" pain. She also reports associated low back pain, nausea, and diarrhea. She denies any vomiting. Patient has had multiple abdominal hernia repairs in the past.    Patient has minimal dysuria. No constitutional symptoms such as fever or chills.    Review my previous evaluation showed the following and 3. Review showed multiple ED visits. Last month was seen for back pain and medication refill. In April was seen for nausea vomiting and abdominal pain. Earlier that month for wrist pain. In February seen for medication refill. In the last 3 months of 2016 patient was seen for back pain and numbness on one occasion neck pain and numbness on another. Another visit for neck pain and medication refill.     Review of ER visit 8/9/17 shows patient presented with left upper quadrant pain to her abdomen with associated nausea and vomiting noted 5 hernia repairs in the past. Patient underwent CT scan which demonstrated no incarceration or obstruction. Patient is to follow-up with Dr. Chery of  surgery. Normal lab work. It may she was seen for back pain. Intra-abdominal pain as well in April. Negative CT.           REVIEW OF SYSTEMS  Review of Systems   Gastrointestinal: Positive for abdominal pain, diarrhea and nausea. Negative for vomiting.   Musculoskeletal: Positive " for back pain.   All other systems reviewed and are negative.  C    PAST MEDICAL HISTORY   has a past medical history of Hypertension and Psychiatric disorder.    SURGICAL HISTORY   has a past surgical history that includes hernia repair; cholecystectomy; appendectomy; and tonsillectomy.    SOCIAL HISTORY  Social History   Substance Use Topics   • Smoking status: Never Smoker   • Alcohol use No      History   Drug Use No       FAMILY HISTORY  None noted    CURRENT MEDICATIONS  No current facility-administered medications on file prior to encounter.      Current Outpatient Prescriptions on File Prior to Encounter   Medication Sig Dispense Refill   • omeprazole (PRILOSEC) 40 MG delayed-release capsule Take 1 Cap by mouth every day. 30 Cap 0   • hydrocodone-acetaminophen (NORCO) 5-325 MG Tab per tablet Take 1-2 Tabs by mouth every four hours as needed (mild pain). 10 Tab 0   • ondansetron (ZOFRAN ODT) 4 MG TABLET DISPERSIBLE Take 1 Tab by mouth every 8 hours as needed for Nausea/Vomiting. 10 Tab 0   • guaifenesin-codeine (TUSSI-ORGANIDIN NR) 100-10 MG/5ML syrup Take 10 mL by mouth 4 times a day as needed for Cough. 120 mL 0   • citalopram (CELEXA) 40 MG Tab Take 1 Tab by mouth every day. 30 Tab 0   • lisinopril-hydrochlorothiazide (PRINZIDE, ZESTORETIC) 10-12.5 MG per tablet Take 1 Tab by mouth every day. 30 Tab 0   • MethylPREDNISolone (MEDROL DOSEPAK) 4 MG Tablet Therapy Pack Use as directed 1 Kit 0   • citalopram (CELEXA) 40 MG Tab Take 1 Tab by mouth every day. 30 Tab 0   • atenolol (TENORMIN) 50 MG Tab Take 1 Tab by mouth every day. 30 Tab 0   • atenolol (TENORMIN) 50 MG Tab Take 1 Tab by mouth every day. 30 Tab 0   • lisinopril-hydrochlorothiazide (PRINZIDE, ZESTORETIC) 10-12.5 MG per tablet Take 1 Tab by mouth every day. 30 Tab 0   • ibuprofen (MOTRIN) 200 MG Tab Take 200 mg by mouth every 6 hours as needed.        ALLERGIES  Allergies   Allergen Reactions   • Augmentin    • Bactrim [Sulfamethoxazole  "W-Trimethoprim]    • Cephalosporins    • Demerol    • Fentanyl    • Morphine    • Sulfa Drugs        PHYSICAL EXAM  VITAL SIGNS: /79   Pulse 67   Temp 37.2 °C (99 °F) (Temporal)   Resp 18   Ht 1.676 m (5' 6\")   Wt 110.1 kg (242 lb 11.6 oz)   SpO2 96%   BMI 39.18 kg/m²   Constitutional: Well developed, Well nourished, No acute distress, Non-toxic appearance.   HENT: Normocephalic, Atraumatic, Bilateral external ears normal, Oropharynx moist, no evidence of dehydration, No oral exudates, Nose normal.   Eyes: PERRLA, EOMI, Conjunctiva normal, No discharge.   Neck: Normal range of motion, No tenderness, Supple, No stridor. No masses. No evidence of meningitis or meningismus.   Lymphatic: No lymphadenopathy noted.   Cardiovascular: Normal heart rate, Normal rhythm, No murmurs, No rubs, No gallops.   Thorax & Lungs: Normal breath sounds, No respiratory distress, No wheezing or rhonchi, No chest tenderness.   Abdomen: Bowel sounds normal, Soft, No tenderness, No masses, No pulsatile masses. No guarding or rebound. No evidence of peritoneal findings. No obvious hernia.  Skin: Warm, Dry, No erythema, No rash. No exanthem.   Back: No tenderness.   Extremities:  No edema, No tenderness, No cyanosis, No clubbing.   Musculoskeletal: Good range of motion in all major joints. No major deformities noted.   Neurologic: Alert & oriented x 3, Normal motor function, No focal deficits noted.   Psychiatric: Affect normal, mood normal.                                                          DIAGNOSTIC STUDIES / PROCEDURES    LABS  Results for orders placed or performed during the hospital encounter of 09/04/17   CBC WITH DIFFERENTIAL   Result Value Ref Range    WBC 5.8 4.8 - 10.8 K/uL    RBC 3.99 (L) 4.20 - 5.40 M/uL    Hemoglobin 13.2 12.0 - 16.0 g/dL    Hematocrit 39.7 37.0 - 47.0 %    MCV 99.5 (H) 81.4 - 97.8 fL    MCH 33.1 (H) 27.0 - 33.0 pg    MCHC 33.2 (L) 33.6 - 35.0 g/dL    RDW 47.8 35.9 - 50.0 fL    Platelet Count " 218 164 - 446 K/uL    MPV 10.6 9.0 - 12.9 fL    Neutrophils-Polys 61.60 44.00 - 72.00 %    Lymphocytes 25.90 22.00 - 41.00 %    Monocytes 8.20 0.00 - 13.40 %    Eosinophils 2.60 0.00 - 6.90 %    Basophils 1.00 0.00 - 1.80 %    Immature Granulocytes 0.70 0.00 - 0.90 %    Nucleated RBC 0.00 /100 WBC    Neutrophils (Absolute) 3.60 2.00 - 7.15 K/uL    Lymphs (Absolute) 1.51 1.00 - 4.80 K/uL    Monos (Absolute) 0.48 0.00 - 0.85 K/uL    Eos (Absolute) 0.15 0.00 - 0.51 K/uL    Baso (Absolute) 0.06 0.00 - 0.12 K/uL    Immature Granulocytes (abs) 0.04 0.00 - 0.11 K/uL    NRBC (Absolute) 0.00 K/uL   COMP METABOLIC PANEL   Result Value Ref Range    Sodium 138 135 - 145 mmol/L    Potassium 3.7 3.6 - 5.5 mmol/L    Chloride 105 96 - 112 mmol/L    Co2 25 20 - 33 mmol/L    Anion Gap 8.0 0.0 - 11.9    Glucose 103 (H) 65 - 99 mg/dL    Bun 16 8 - 22 mg/dL    Creatinine 0.81 0.50 - 1.40 mg/dL    Calcium 9.3 8.5 - 10.5 mg/dL    AST(SGOT) 69 (H) 12 - 45 U/L    ALT(SGPT) 93 (H) 2 - 50 U/L    Alkaline Phosphatase 102 (H) 30 - 99 U/L    Total Bilirubin 0.3 0.1 - 1.5 mg/dL    Albumin 3.9 3.2 - 4.9 g/dL    Total Protein 6.6 6.0 - 8.2 g/dL    Globulin 2.7 1.9 - 3.5 g/dL    A-G Ratio 1.4 g/dL   LIPASE   Result Value Ref Range    Lipase 20 11 - 82 U/L   URINALYSIS CULTURE, IF INDICATED   Result Value Ref Range    Color Yellow     Character Clear     Specific Gravity 1.027 <1.035    Ph 5.5 5.0 - 8.0    Glucose Negative Negative mg/dL    Ketones Negative Negative mg/dL    Protein Negative Negative mg/dL    Bilirubin Negative Negative    Urobilinogen, Urine 1.0 Negative    Nitrite Negative Negative    Leukocyte Esterase Trace (A) Negative    Occult Blood Negative Negative    Micro Urine Req Microscopic     Culture Indicated Yes UA Culture   URINE DRUG SCREEN   Result Value Ref Range    Amphetamines Urine Negative Negative    Barbiturates Negative Negative    Benzodiazepines Negative Negative    Cocaine Metabolite Negative Negative    Methadone  Negative Negative    Opiates Negative Negative    Oxycodone Negative Negative    Phencyclidine -Pcp Negative Negative    Propoxyphene Negative Negative    Cannabinoid Metab Negative Negative   URINE MICROSCOPIC (W/UA)   Result Value Ref Range    WBC 5-10 (A) /hpf    RBC 0-2 /hpf    Bacteria Many (A) None /hpf    Epithelial Cells Few /hpf    Hyaline Cast 0-2 /lpf   ESTIMATED GFR   Result Value Ref Range    GFR If African American >60 >60 mL/min/1.73 m 2    GFR If Non African American >60 >60 mL/min/1.73 m 2      All labs reviewed by me.    RADIOLOGY  CT-ABDOMEN-PELVIS WITH   Final Result      1.  No bowel obstruction or pneumoperitoneum.   2.  No focal mesenteric inflammatory process.   3.  Postoperative changes as described.   4.  Ventral abdominal hernia again present with overlying crescentic abdominal fluid collection which is unchanged from prior exam.   5.  Fatty infiltration of liver.        The radiologist's interpretation of all radiological studies have been reviewed by me.    COURSE & MEDICAL DECISION MAKING  Nursing notes, VS, PMSFHx reviewed in chart.    Review my previous evaluation showed the following and 3. Review showed multiple ED visits. Last month was seen for back pain and medication refill. In April was seen for nausea vomiting and abdominal pain. Earlier that month for wrist pain. In February seen for medication refill. In the last 3 months of 2016 patient was seen for back pain and numbness on one occasion neck pain and numbness on another. Another visit for neck pain and medication refill.     Review of ER visit 8/9/17 shows patient presented with left upper quadrant pain to her abdomen with associated nausea and vomiting noted 5 hernia repairs in the past. Patient underwent CT scan which demonstrated no incarceration or obstruction. Patient is to follow-up with Dr. Chery of  surgery. Normal lab work. It may she was seen for back pain. Intra-abdominal pain as well in April. Negative CT.            5:21 PM - Patient seen and examined at bedside. Informed patient she will likely need to see a surgeon. Ordered CT abdomen-pelvis, urine drug screen, CBC, CMP, lipase, urinalysis culture to evaluate her symptoms. The differential diagnoses include but are not limited to: surgical vs non-surgical abdominal pain, recurrent left lower quadrant abdominal pain     Review of prescription monitoring program shows narcotic scar 340 sedative to 3011 prescribers 6 pharmacies multiples prescriptions for hydrocodone oxycodone tramadol last prescription 8/10/17.     The patient will return for new or worsening symptoms and is stable at the time of discharge.    The patient is referred to a primary physician for blood pressure management, diabetic screening, and for all other preventative health concerns.    DISPOSITION:  Patient will be discharged home in stable condition.    FOLLOW UP:  Jone Hinojosa M.D.  [address] [place] [id number]  [phone]    Schedule an appointment as soon as possible for a visit in 1 day        OUTPATIENT MEDICATIONS:  Discharge Medication List as of 9/4/2017  8:07 PM      START taking these medications    Details   !! ondansetron (ZOFRAN ODT) 4 MG TABLET DISPERSIBLE Take 1 Tab by mouth Once for 1 dose., Disp-10 Tab, R-0, Normal      tramadol (ULTRAM) 50 MG Tab Take 1-2 Tabs by mouth every 6 hours as needed (pain)., Disp-14 Tab, R-0, Print Rx Paper       !! - Potential duplicate medications found. Please discuss with provider.           FINAL IMPRESSION  1. Ventral hernia without obstruction or gangrene          Damien CABRALES (Scribhumza), am scribing for, and in the presence of, Gumaro Luo M.D..    Electronically signed by: Damien Rod (Pritesh), 9/4/2017    IGumaro M.D. personally performed the services described in this documentation, as scribed by Damien Rod in my presence, and it is both accurate and complete.    The note  accurately reflects work and decisions made by me.  Gumaro Luo  9/5/2017  12:08 AM

## 2017-09-06 LAB
BACTERIA UR CULT: ABNORMAL
BACTERIA UR CULT: ABNORMAL
SIGNIFICANT IND 70042: ABNORMAL
SITE SITE: ABNORMAL
SOURCE SOURCE: ABNORMAL

## 2017-09-08 NOTE — PROGRESS NOTES
ED Positive Culture Follow-up/Notification Note:    Date: 9/8/17     Patient seen in the ED on 9/4/2017 for back and abdominal pain from a hernia that has worsened over the past couple of days.   1. Ventral hernia without obstruction or gangrene       Discharge Medication List as of 9/4/2017  8:07 PM      START taking these medications    Details   !! ondansetron (ZOFRAN ODT) 4 MG TABLET DISPERSIBLE Take 1 Tab by mouth Once for 1 dose., Disp-10 Tab, R-0, Normal      tramadol (ULTRAM) 50 MG Tab Take 1-2 Tabs by mouth every 6 hours as needed (pain)., Disp-14 Tab, R-0, Print Rx Paper       !! - Potential duplicate medications found. Please discuss with provider.          Allergies: Augmentin; Bactrim [sulfamethoxazole w-trimethoprim]; Cephalosporins; Demerol; Fentanyl; Morphine; and Sulfa drugs     Final cultures:   Results     Procedure Component Value Units Date/Time    URINE CULTURE(NEW) [[id number]]  (Abnormal)  (Susceptibility) Collected:  09/04/17 4976    Order Status:  Completed Specimen:  Urine Updated:  09/06/17 0915     Significant Indicator POS (POS)     Source UR     Site --     Urine Culture -- (A)     Urine Culture -- (A)     Escherichia coli  >100,000 cfu/mL      Culture & Susceptibility     ESCHERICHIA COLI     Antibiotic Sensitivity Microscan Unit Status    Ampicillin Resistant >16 mcg/mL Final    Cefepime Sensitive <=8 mcg/mL Final    Cefotaxime Sensitive <=2 mcg/mL Final    Cefotetan Sensitive <=16 mcg/mL Final    Ceftazidime Sensitive <=1 mcg/mL Final    Ceftriaxone Sensitive <=8 mcg/mL Final    Cefuroxime Sensitive <=4 mcg/mL Final    Cephalothin Intermediate 16 mcg/mL Final    Ciprofloxacin Resistant >2 mcg/mL Final    Gentamicin Sensitive <=4 mcg/mL Final    Levofloxacin Resistant >4 mcg/mL Final    Nitrofurantoin Sensitive <=32 mcg/mL Final    Pip/Tazobactam Sensitive <=16 mcg/mL Final    Piperacillin Resistant >64 mcg/mL Final    Tigecycline Sensitive <=2 mcg/mL Final    Tobramycin Sensitive <=4  mcg/mL Final    Trimeth/Sulfa Resistant >2/38 mcg/mL Final                       URINALYSIS CULTURE, IF INDICATED [[id number]]  (Abnormal) Collected:  09/04/17 8384    Order Status:  Completed Specimen:  Urine Updated:  09/04/17 1815     Color Yellow     Character Clear     Specific Gravity 1.027     Ph 5.5     Glucose Negative mg/dL      Ketones Negative mg/dL      Protein Negative mg/dL      Bilirubin Negative     Urobilinogen, Urine 1.0     Nitrite Negative     Leukocyte Esterase Trace (A)     Occult Blood Negative     Micro Urine Req Microscopic     Culture Indicated Yes UA Culture           Plan:   Urine culture positive for Escherichia coli. No dysuria or other symptoms endorsed, likely asymptomatic bacteriuria that requires no treatment at this time.    Herbert Rodriguez, PharmD

## 2018-04-06 ENCOUNTER — HOSPITAL ENCOUNTER (EMERGENCY)
Facility: MEDICAL CENTER | Age: 48
End: 2018-04-07
Attending: EMERGENCY MEDICINE
Payer: MEDICAID

## 2018-04-06 ENCOUNTER — APPOINTMENT (OUTPATIENT)
Dept: RADIOLOGY | Facility: MEDICAL CENTER | Age: 48
End: 2018-04-06
Attending: EMERGENCY MEDICINE
Payer: MEDICAID

## 2018-04-06 DIAGNOSIS — R10.11 RIGHT UPPER QUADRANT ABDOMINAL PAIN: ICD-10-CM

## 2018-04-06 LAB
ALBUMIN SERPL BCP-MCNC: 4.4 G/DL (ref 3.2–4.9)
ALBUMIN/GLOB SERPL: 1.4 G/DL
ALP SERPL-CCNC: 97 U/L (ref 30–99)
ALT SERPL-CCNC: 54 U/L (ref 2–50)
ANION GAP SERPL CALC-SCNC: 10 MMOL/L (ref 0–11.9)
AST SERPL-CCNC: 47 U/L (ref 12–45)
BASOPHILS # BLD AUTO: 0.6 % (ref 0–1.8)
BASOPHILS # BLD: 0.05 K/UL (ref 0–0.12)
BILIRUB SERPL-MCNC: 0.4 MG/DL (ref 0.1–1.5)
BUN SERPL-MCNC: 28 MG/DL (ref 8–22)
CALCIUM SERPL-MCNC: 9.6 MG/DL (ref 8.5–10.5)
CHLORIDE SERPL-SCNC: 105 MMOL/L (ref 96–112)
CO2 SERPL-SCNC: 21 MMOL/L (ref 20–33)
CREAT SERPL-MCNC: 0.96 MG/DL (ref 0.5–1.4)
EOSINOPHIL # BLD AUTO: 0.31 K/UL (ref 0–0.51)
EOSINOPHIL NFR BLD: 3.6 % (ref 0–6.9)
ERYTHROCYTE [DISTWIDTH] IN BLOOD BY AUTOMATED COUNT: 44.4 FL (ref 35.9–50)
GLOBULIN SER CALC-MCNC: 3.1 G/DL (ref 1.9–3.5)
GLUCOSE SERPL-MCNC: 102 MG/DL (ref 65–99)
HCT VFR BLD AUTO: 43.2 % (ref 37–47)
HGB BLD-MCNC: 14.9 G/DL (ref 12–16)
IMM GRANULOCYTES # BLD AUTO: 0.04 K/UL (ref 0–0.11)
IMM GRANULOCYTES NFR BLD AUTO: 0.5 % (ref 0–0.9)
LIPASE SERPL-CCNC: 48 U/L (ref 11–82)
LYMPHOCYTES # BLD AUTO: 2.11 K/UL (ref 1–4.8)
LYMPHOCYTES NFR BLD: 24.7 % (ref 22–41)
MCH RBC QN AUTO: 33.4 PG (ref 27–33)
MCHC RBC AUTO-ENTMCNC: 34.5 G/DL (ref 33.6–35)
MCV RBC AUTO: 96.9 FL (ref 81.4–97.8)
MONOCYTES # BLD AUTO: 0.5 K/UL (ref 0–0.85)
MONOCYTES NFR BLD AUTO: 5.9 % (ref 0–13.4)
NEUTROPHILS # BLD AUTO: 5.52 K/UL (ref 2–7.15)
NEUTROPHILS NFR BLD: 64.7 % (ref 44–72)
NRBC # BLD AUTO: 0 K/UL
NRBC BLD-RTO: 0 /100 WBC
PLATELET # BLD AUTO: 309 K/UL (ref 164–446)
PMV BLD AUTO: 10.2 FL (ref 9–12.9)
POTASSIUM SERPL-SCNC: 4.1 MMOL/L (ref 3.6–5.5)
PROT SERPL-MCNC: 7.5 G/DL (ref 6–8.2)
RBC # BLD AUTO: 4.46 M/UL (ref 4.2–5.4)
SODIUM SERPL-SCNC: 136 MMOL/L (ref 135–145)
WBC # BLD AUTO: 8.5 K/UL (ref 4.8–10.8)

## 2018-04-06 PROCEDURE — 700117 HCHG RX CONTRAST REV CODE 255: Performed by: EMERGENCY MEDICINE

## 2018-04-06 PROCEDURE — 74177 CT ABD & PELVIS W/CONTRAST: CPT

## 2018-04-06 PROCEDURE — 36415 COLL VENOUS BLD VENIPUNCTURE: CPT

## 2018-04-06 PROCEDURE — 96374 THER/PROPH/DIAG INJ IV PUSH: CPT

## 2018-04-06 PROCEDURE — 83690 ASSAY OF LIPASE: CPT

## 2018-04-06 PROCEDURE — 99284 EMERGENCY DEPT VISIT MOD MDM: CPT

## 2018-04-06 PROCEDURE — 700111 HCHG RX REV CODE 636 W/ 250 OVERRIDE (IP): Performed by: EMERGENCY MEDICINE

## 2018-04-06 PROCEDURE — 96375 TX/PRO/DX INJ NEW DRUG ADDON: CPT

## 2018-04-06 PROCEDURE — 80053 COMPREHEN METABOLIC PANEL: CPT

## 2018-04-06 PROCEDURE — 85025 COMPLETE CBC W/AUTO DIFF WBC: CPT

## 2018-04-06 RX ORDER — KETOROLAC TROMETHAMINE 30 MG/ML
15 INJECTION, SOLUTION INTRAMUSCULAR; INTRAVENOUS ONCE
Status: COMPLETED | OUTPATIENT
Start: 2018-04-06 | End: 2018-04-06

## 2018-04-06 RX ORDER — DIPHENHYDRAMINE HYDROCHLORIDE 50 MG/ML
12.5 INJECTION INTRAMUSCULAR; INTRAVENOUS ONCE
Status: COMPLETED | OUTPATIENT
Start: 2018-04-06 | End: 2018-04-06

## 2018-04-06 RX ADMIN — DIPHENHYDRAMINE HYDROCHLORIDE 12.5 MG: 50 INJECTION, SOLUTION INTRAMUSCULAR; INTRAVENOUS at 23:15

## 2018-04-06 RX ADMIN — KETOROLAC TROMETHAMINE 15 MG: 30 INJECTION, SOLUTION INTRAMUSCULAR at 23:14

## 2018-04-06 RX ADMIN — PROCHLORPERAZINE EDISYLATE 10 MG: 5 INJECTION INTRAMUSCULAR; INTRAVENOUS at 23:19

## 2018-04-06 RX ADMIN — IOHEXOL 100 ML: 350 INJECTION, SOLUTION INTRAVENOUS at 23:40

## 2018-04-06 ASSESSMENT — LIFESTYLE VARIABLES: DO YOU DRINK ALCOHOL: NO

## 2018-04-06 ASSESSMENT — PAIN SCALES - GENERAL
PAINLEVEL_OUTOF10: 10
PAINLEVEL_OUTOF10: 9
PAINLEVEL_OUTOF10: 0

## 2018-04-07 VITALS
WEIGHT: 230.6 LBS | HEIGHT: 66 IN | RESPIRATION RATE: 16 BRPM | TEMPERATURE: 97.4 F | OXYGEN SATURATION: 93 % | BODY MASS INDEX: 37.06 KG/M2 | SYSTOLIC BLOOD PRESSURE: 116 MMHG | HEART RATE: 58 BPM | DIASTOLIC BLOOD PRESSURE: 56 MMHG

## 2018-04-07 LAB
APPEARANCE UR: CLEAR
BILIRUB UR QL STRIP.AUTO: NEGATIVE
COLOR UR: YELLOW
CULTURE IF INDICATED INDCX: NO UA CULTURE
GLUCOSE UR STRIP.AUTO-MCNC: NEGATIVE MG/DL
KETONES UR STRIP.AUTO-MCNC: NEGATIVE MG/DL
LEUKOCYTE ESTERASE UR QL STRIP.AUTO: NEGATIVE
MICRO URNS: NORMAL
NITRITE UR QL STRIP.AUTO: NEGATIVE
PH UR STRIP.AUTO: 6 [PH]
PROT UR QL STRIP: NEGATIVE MG/DL
RBC UR QL AUTO: NEGATIVE
SP GR UR STRIP.AUTO: 1.02
UROBILINOGEN UR STRIP.AUTO-MCNC: 0.2 MG/DL

## 2018-04-07 PROCEDURE — 81003 URINALYSIS AUTO W/O SCOPE: CPT

## 2018-04-07 RX ORDER — DICYCLOMINE HCL 20 MG
20 TABLET ORAL EVERY 6 HOURS PRN
Qty: 30 TAB | Refills: 0 | Status: SHIPPED | OUTPATIENT
Start: 2018-04-07

## 2018-04-07 NOTE — ED NOTES
Pt discharged to home  Pt understands follow up care and discharge instructions  Pt given  1 RX  Offers no complaints at this time   Discharged with self/family to lobby  Pt states calling for a cab

## 2018-04-07 NOTE — ED PROVIDER NOTES
CHIEF COMPLAINT  Chief Complaint   Patient presents with   • Hernia     Pt. states she has a hernia to her RLQ and states severe pain to that area. Pt. states n/v with the pain. Pt. states the last time she had these s/s she had an incarcerated hernia. Pt. has multiple palpable masses in her lower abdominal area. Pt. is tender to palpation.       HPI (1,4)  Jaqueline Morocho is a 47 y.o. female with past medical history of hypertension, complex abdominal surgical history with 13 surgeries in last 6 years because of recurrent complicated abdominal wall hernias now presented with right-sided upper and mid abdomen as well as vomiting since this morning.     Patient had a hysterectomy 6 years ago which was later complicated with an abdominal wall hernia. Subsequently she had multiple abdominal wall hernias with complicated infected mesh as well as erosion into bowel resulting in bowel resection and anastomosis. Her last surgery was one and half years ago by Dr. Chery due to, incarcerated hernia and mesh was placed.    Since this morning patient started having abdominal pain. In contrast to triage note, patient localizes her abdominal pain in right upper and mid abdomen as well as an epigastric region. She denies radiation of pain into the groin or any associated chest pain. It is severe 9-10 out of 10 pain, sharp cramping in nature and is constant and is aggravated by movements. She took 4 ibuprofen since this morning with minimal relief. She denies significant heartburns. She had 4 episodes of vomiting. She had her lunch but afterwards she vomited she is still feels nauseous and is having dry heaves. She denies drinking alcohol or any drugs. She also had one liquid bowel movement and is not passing gas now.    Of note patient was also seen at [facility] and was treated with cellulitis one week ago with antibiotics.     Location: Right upper and mid abdomen.  Quality: Sharp/cramping/twisting.  Severity:  Severe 9-10 out of 10/  Duration: Constant since this morning.  Timing: Constant . Context: Relatively acute.  Modifying factor: Aggravated with movements.  Associated symptoms: Vomiting, nausea, one episode of diarrhea.      REVIEW OF SYSTEMS(2/10)  Pertinent positives include: Abdominal pain, vomiting,  Pertinent negatives include: Fever, chest pain.   All other systems are negative.     PAST MEDICAL HISTORY(PFS1,2)  Past Medical History:   Diagnosis Date   • Hypertension    • Psychiatric disorder     anxiety       FAMILY HISTORY  History reviewed. No pertinent family history.    SOCIAL HISTORY  Social History   Substance Use Topics   • Smoking status: Never Smoker   • Smokeless tobacco: Never Used   • Alcohol use No     History   Drug Use No       SURGICAL HISTORY  Past Surgical History:   Procedure Laterality Date   • APPENDECTOMY     • CHOLECYSTECTOMY     • HERNIA REPAIR      x 11   • TONSILLECTOMY         CURRENT MEDICATIONS  Home Medications     Reviewed by Jennifer Linares R.N. (Registered Nurse) on 04/07/18 at 0034  Med List Status: Not Addressed   Medication Last Dose Status   atenolol (TENORMIN) 50 MG Tab taking Active   atenolol (TENORMIN) 50 MG Tab  Active   citalopram (CELEXA) 40 MG Tab taking Active   citalopram (CELEXA) 40 MG Tab  Active   guaifenesin-codeine (TUSSI-ORGANIDIN NR) 100-10 MG/5ML syrup not taking Active   hydrocodone-acetaminophen (NORCO) 5-325 MG Tab per tablet  Active   ibuprofen (MOTRIN) 200 MG Tab prn Active   lisinopril-hydrochlorothiazide (PRINZIDE, ZESTORETIC) 10-12.5 MG per tablet taking Active   lisinopril-hydrochlorothiazide (PRINZIDE, ZESTORETIC) 10-12.5 MG per tablet  Active   MethylPREDNISolone (MEDROL DOSEPAK) 4 MG Tablet Therapy Pack not taking Active   omeprazole (PRILOSEC) 40 MG delayed-release capsule  Active   ondansetron (ZOFRAN ODT) 4 MG TABLET DISPERSIBLE  Active   tramadol (ULTRAM) 50 MG Tab  Active                ALLERGIES  Allergies   Allergen Reactions   •  "Augmentin    • Bactrim [Sulfamethoxazole W-Trimethoprim]    • Cephalosporins    • Demerol    • Fentanyl    • Morphine    • Sulfa Drugs        PHYSICAL EXAM (2,8)  VITAL SIGNS: /56   Pulse (!) 58   Temp 36.3 °C (97.4 °F)   Resp 16   Ht 1.676 m (5' 6\")   Wt 104.6 kg (230 lb 9.6 oz)   SpO2 93%   BMI 37.22 kg/m²      Constitutional: No acute distress.  HENT: Normocephalic atraumatic, no lymphadenopathy.  Eyes: No pallor or jaundice.  Cardiovascular: Normal sinus rhythm, no murmur or gallop.  Respiratory: Clear to auscultation, no wheezes or crackles.  Gastrointestinal: Abdomen soft, no rebound tenderness, no guarding or rigidity: Tenderness on deep palpation in right upper and epigastric region; palpable tender mass in the right abdomen   Sluggish bowel sounds  Skin: No rash, bruises on abdominal wall due to Lovenox injection   Genitourinary:  No CVA or suprapubic tenderness.  Neurologic: No focal deficit  Psychiatric: Normal mood     DIFFERENTIAL DIAGNOSIS:    Intestinal obstruction/obstructed hernia/gastritis/intestinal smooth muscle spasm      RADIOLOGY/PROCEDURES  CT-ABDOMEN-PELVIS WITH   Final Result         1.  No acute abnormality.   2.  Hepatomegaly and diffuse hepatic steatosis.   3.  Pectus excavatum deformity deforming the right heart.   4.  Small fat-containing left inguinal hernia   5.  Diverticulosis          LABORATORY: Reviewed as below.  Results for orders placed or performed during the hospital encounter of 04/06/18   CBC WITH DIFFERENTIAL   Result Value Ref Range    WBC 8.5 4.8 - 10.8 K/uL    RBC 4.46 4.20 - 5.40 M/uL    Hemoglobin 14.9 12.0 - 16.0 g/dL    Hematocrit 43.2 37.0 - 47.0 %    MCV 96.9 81.4 - 97.8 fL    MCH 33.4 (H) 27.0 - 33.0 pg    MCHC 34.5 33.6 - 35.0 g/dL    RDW 44.4 35.9 - 50.0 fL    Platelet Count 309 164 - 446 K/uL    MPV 10.2 9.0 - 12.9 fL    Neutrophils-Polys 64.70 44.00 - 72.00 %    Lymphocytes 24.70 22.00 - 41.00 %    Monocytes 5.90 0.00 - 13.40 %    Eosinophils " 3.60 0.00 - 6.90 %    Basophils 0.60 0.00 - 1.80 %    Immature Granulocytes 0.50 0.00 - 0.90 %    Nucleated RBC 0.00 /100 WBC    Neutrophils (Absolute) 5.52 2.00 - 7.15 K/uL    Lymphs (Absolute) 2.11 1.00 - 4.80 K/uL    Monos (Absolute) 0.50 0.00 - 0.85 K/uL    Eos (Absolute) 0.31 0.00 - 0.51 K/uL    Baso (Absolute) 0.05 0.00 - 0.12 K/uL    Immature Granulocytes (abs) 0.04 0.00 - 0.11 K/uL    NRBC (Absolute) 0.00 K/uL   COMP METABOLIC PANEL   Result Value Ref Range    Sodium 136 135 - 145 mmol/L    Potassium 4.1 3.6 - 5.5 mmol/L    Chloride 105 96 - 112 mmol/L    Co2 21 20 - 33 mmol/L    Anion Gap 10.0 0.0 - 11.9    Glucose 102 (H) 65 - 99 mg/dL    Bun 28 (H) 8 - 22 mg/dL    Creatinine 0.96 0.50 - 1.40 mg/dL    Calcium 9.6 8.5 - 10.5 mg/dL    AST(SGOT) 47 (H) 12 - 45 U/L    ALT(SGPT) 54 (H) 2 - 50 U/L    Alkaline Phosphatase 97 30 - 99 U/L    Total Bilirubin 0.4 0.1 - 1.5 mg/dL    Albumin 4.4 3.2 - 4.9 g/dL    Total Protein 7.5 6.0 - 8.2 g/dL    Globulin 3.1 1.9 - 3.5 g/dL    A-G Ratio 1.4 g/dL   LIPASE   Result Value Ref Range    Lipase 48 11 - 82 U/L   ESTIMATED GFR   Result Value Ref Range    GFR If African American >60 >60 mL/min/1.73 m 2    GFR If Non African American >60 >60 mL/min/1.73 m 2   URINALYSIS,CULTURE IF INDICATED   Result Value Ref Range    Color Yellow     Character Clear     Specific Gravity 1.020 <1.035    Ph 6.0 5.0 - 8.0    Glucose Negative Negative mg/dL    Ketones Negative Negative mg/dL    Protein Negative Negative mg/dL    Bilirubin Negative Negative    Urobilinogen, Urine 0.2 Negative    Nitrite Negative Negative    Leukocyte Esterase Negative Negative    Occult Blood Negative Negative    Micro Urine Req see below     Culture Indicated No UA Culture       INTERVENTIONS:  Medications   prochlorperazine (COMPAZINE) injection 10 mg (10 mg Intravenous Given 4/6/18 2319)   diphenhydrAMINE (BENADRYL) injection 12.5 mg (12.5 mg Intravenous Given 4/6/18 2315)   ketorolac (TORADOL) injection 15 mg  (15 mg Intravenous Given 4/6/18 8246)   iohexol (OMNIPAQUE) 350 mg/mL (100 mL Intravenous Given 4/6/18 3410)   .  Response: Patient has borderline glaucoma. Resolved    COURSE & MEDICAL DECISION MAKING      Patient was evaluated at bedside for right-sided upper and mid abdominal pain and vomiting. Differential diagnosis included intestinal obstruction/dyspepsia/intestinal smooth muscle spasm. Her lab work was unremarkable showing WBC 8.5, sodium 136, potassium 4.1, AST 47, ALT 54, lipase 48. She has baseline liver function  test abnormalities which were stable. Given her history of multiple surgeries and recurrent incarcerated hernia, CT abdomen was done which did not show any acute abnormality. She was treated with Compazine and Benadryl for nausea and vomiting. Toradol was given for pain. She responded well and her pain resolved. Reassess at the bedside prior to discharge, the patient was sleeping comfortably.    Lab results imaging findings were discussed with the patient and she was reassured about normal lab and abdominal imaging findings. She was instructed to follow up with PCP as outpatient.      PLAN:  Patient will be discharged home with antispasmodics with recommendations to follow with her PCP as outpatient    CONDITION: Stable to discharge home    FINAL IMPRESSION  1. Right upper quadrant abdominal pain

## 2018-04-07 NOTE — ED NOTES
Patient walked to [facility] room 27 with a steady gate. Placed her into gown, on auto blood pressure, spo2, gave call light, gave pillow, and warm blanket.  Labs drawn out in triage and urine sample is at bedside.

## 2018-04-07 NOTE — ED TRIAGE NOTES
"Jaqueline Morocho  47 y.o. female  Chief Complaint   Patient presents with   • Hernia     Pt. states she has a hernia to her RLQ and states severe pain to that area. Pt. states n/v with the pain. Pt. states the last time she had these s/s she had an incarcerated hernia. Pt. has multiple palpable masses in her lower abdominal area. Pt. is tender to palpation.     /98   Pulse 62   Temp 36.3 °C (97.4 °F)   Resp 18   Ht 1.676 m (5' 6\")   Wt 104.6 kg (230 lb 9.6 oz)   SpO2 98%   BMI 37.22 kg/m²     Pt. States this pain is throbbing in nature and radiates to her back with a burning sensation.  Pt amb to triage with steady gait for above complaint.   Pt is alert and oriented, speaking in full sentences, follows commands and responds appropriately to questions. NAD. Resp are even and unlabored.  Pt placed in lobby. Pt educated on triage process. Pt encouraged to alert staff for any changes.  "

## 2018-04-07 NOTE — DISCHARGE INSTRUCTIONS
Your laboratory tests were very reassuring, and your CT scan did not show any evidence of infection or incarcerated hernia or bowel obstruction. Please call to schedule follow-up with your primary care doctor. We've sent some medication for abdominal pain and cramps to your pharmacy. In addition to the medication that we have prescribed, you can take Tylenol and Motrin.    Abdominal Pain (Nonspecific)  Your exam might not show the exact reason you have abdominal pain. Since there are many different causes of abdominal pain, another checkup and more tests may be needed. It is very important to follow up for lasting (persistent) or worsening symptoms. A possible cause of abdominal pain in any person who still has his or her appendix is acute appendicitis. Appendicitis is often hard to diagnose. Normal blood tests, urine tests, ultrasound, and CT scans do not completely rule out early appendicitis or other causes of abdominal pain. Sometimes, only the changes that happen over time will allow appendicitis and other causes of abdominal pain to be determined. Other potential problems that may require surgery may also take time to become more apparent. Because of this, it is important that you follow all of the instructions below.  HOME CARE INSTRUCTIONS   · Rest as much as possible.   · Do not eat solid food until your pain is gone.   · While adults or children have pain: A diet of water, weak decaffeinated tea, broth or bouillon, gelatin, oral rehydration solutions (ORS), frozen ice pops, or ice chips may be helpful.   · When pain is gone in adults or children: Start a light diet (dry toast, crackers, applesauce, or white rice). Increase the diet slowly as long as it does not bother you. Eat no dairy products (including cheese and eggs) and no spicy, fatty, fried, or high-fiber foods.   · Use no alcohol, caffeine, or cigarettes.   · Take your regular medicines unless your caregiver told you not to.   · Take any  prescribed medicine as directed.   · Only take over-the-counter or prescription medicines for pain, discomfort, or fever as directed by your caregiver. Do not give aspirin to children.   If your caregiver has given you a follow-up appointment, it is very important to keep that appointment. Not keeping the appointment could result in a permanent injury and/or lasting (chronic) pain and/or disability. If there is any problem keeping the appointment, you must call to reschedule.   SEEK IMMEDIATE MEDICAL CARE IF:   · Your pain is not gone in 24 hours.   · Your pain becomes worse, changes location, or feels different.   · You or your child has an oral temperature above 102° F (38.9° C), not controlled by medicine.   · Your baby is older than 3 months with a rectal temperature of 102° F (38.9° C) or higher.   · Your baby is 3 months old or younger with a rectal temperature of 100.4° F (38° C) or higher.   · You have shaking chills.   · You keep throwing up (vomiting) or cannot drink liquids.   · There is blood in your vomit or you see blood in your bowel movements.   · Your bowel movements become dark or black.   · You have frequent bowel movements.   · Your bowel movements stop (become blocked) or you cannot pass gas.   · You have bloody, frequent, or painful urination.   · You have yellow discoloration in the skin or whites of the eyes.   · Your stomach becomes bloated or bigger.   · You have dizziness or fainting.   · You have chest or back pain.   MAKE SURE YOU:   · Understand these instructions.   · Will watch your condition.   · Will get help right away if you are not doing well or get worse.   Document Released: 12/18/2006 Document Revised: 03/11/2013 Document Reviewed: 11/15/2010  [employer]® Patient Information ©2013 [employer].

## 2018-06-05 ENCOUNTER — HOSPITAL ENCOUNTER (EMERGENCY)
Facility: MEDICAL CENTER | Age: 48
End: 2018-06-06
Attending: EMERGENCY MEDICINE

## 2018-06-05 ENCOUNTER — APPOINTMENT (OUTPATIENT)
Dept: RADIOLOGY | Facility: MEDICAL CENTER | Age: 48
End: 2018-06-05
Attending: EMERGENCY MEDICINE

## 2018-06-05 DIAGNOSIS — R74.01 TRANSAMINITIS: ICD-10-CM

## 2018-06-05 DIAGNOSIS — R10.84 GENERALIZED ABDOMINAL PAIN: ICD-10-CM

## 2018-06-05 DIAGNOSIS — R19.7 DIARRHEA, UNSPECIFIED TYPE: ICD-10-CM

## 2018-06-05 DIAGNOSIS — K43.9 VENTRAL HERNIA WITHOUT OBSTRUCTION OR GANGRENE: ICD-10-CM

## 2018-06-05 LAB
ALBUMIN SERPL BCP-MCNC: 4.5 G/DL (ref 3.2–4.9)
ALBUMIN/GLOB SERPL: 1.4 G/DL
ALP SERPL-CCNC: 181 U/L (ref 30–99)
ALT SERPL-CCNC: 130 U/L (ref 2–50)
ANION GAP SERPL CALC-SCNC: 9 MMOL/L (ref 0–11.9)
AST SERPL-CCNC: 100 U/L (ref 12–45)
BASOPHILS # BLD AUTO: 0.8 % (ref 0–1.8)
BASOPHILS # BLD: 0.05 K/UL (ref 0–0.12)
BILIRUB SERPL-MCNC: 0.5 MG/DL (ref 0.1–1.5)
BUN SERPL-MCNC: 16 MG/DL (ref 8–22)
CALCIUM SERPL-MCNC: 9.7 MG/DL (ref 8.5–10.5)
CHLORIDE SERPL-SCNC: 104 MMOL/L (ref 96–112)
CO2 SERPL-SCNC: 23 MMOL/L (ref 20–33)
CREAT SERPL-MCNC: 0.72 MG/DL (ref 0.5–1.4)
EOSINOPHIL # BLD AUTO: 0.2 K/UL (ref 0–0.51)
EOSINOPHIL NFR BLD: 3.2 % (ref 0–6.9)
ERYTHROCYTE [DISTWIDTH] IN BLOOD BY AUTOMATED COUNT: 49 FL (ref 35.9–50)
GLOBULIN SER CALC-MCNC: 3.2 G/DL (ref 1.9–3.5)
GLUCOSE SERPL-MCNC: 101 MG/DL (ref 65–99)
HCG SERPL QL: NEGATIVE
HCT VFR BLD AUTO: 42.6 % (ref 37–47)
HGB BLD-MCNC: 13.7 G/DL (ref 12–16)
IMM GRANULOCYTES # BLD AUTO: 0.03 K/UL (ref 0–0.11)
IMM GRANULOCYTES NFR BLD AUTO: 0.5 % (ref 0–0.9)
LIPASE SERPL-CCNC: 35 U/L (ref 11–82)
LYMPHOCYTES # BLD AUTO: 1.37 K/UL (ref 1–4.8)
LYMPHOCYTES NFR BLD: 22 % (ref 22–41)
MCH RBC QN AUTO: 31.8 PG (ref 27–33)
MCHC RBC AUTO-ENTMCNC: 32.2 G/DL (ref 33.6–35)
MCV RBC AUTO: 98.8 FL (ref 81.4–97.8)
MONOCYTES # BLD AUTO: 0.51 K/UL (ref 0–0.85)
MONOCYTES NFR BLD AUTO: 8.2 % (ref 0–13.4)
NEUTROPHILS # BLD AUTO: 4.08 K/UL (ref 2–7.15)
NEUTROPHILS NFR BLD: 65.3 % (ref 44–72)
NRBC # BLD AUTO: 0 K/UL
NRBC BLD-RTO: 0 /100 WBC
PLATELET # BLD AUTO: 247 K/UL (ref 164–446)
PMV BLD AUTO: 10.1 FL (ref 9–12.9)
POTASSIUM SERPL-SCNC: 3.9 MMOL/L (ref 3.6–5.5)
PROT SERPL-MCNC: 7.7 G/DL (ref 6–8.2)
RBC # BLD AUTO: 4.31 M/UL (ref 4.2–5.4)
SODIUM SERPL-SCNC: 136 MMOL/L (ref 135–145)
WBC # BLD AUTO: 6.2 K/UL (ref 4.8–10.8)

## 2018-06-05 PROCEDURE — 80053 COMPREHEN METABOLIC PANEL: CPT

## 2018-06-05 PROCEDURE — 84703 CHORIONIC GONADOTROPIN ASSAY: CPT

## 2018-06-05 PROCEDURE — 96374 THER/PROPH/DIAG INJ IV PUSH: CPT

## 2018-06-05 PROCEDURE — 93971 EXTREMITY STUDY: CPT

## 2018-06-05 PROCEDURE — 36415 COLL VENOUS BLD VENIPUNCTURE: CPT

## 2018-06-05 PROCEDURE — 700111 HCHG RX REV CODE 636 W/ 250 OVERRIDE (IP): Performed by: EMERGENCY MEDICINE

## 2018-06-05 PROCEDURE — 85025 COMPLETE CBC W/AUTO DIFF WBC: CPT

## 2018-06-05 PROCEDURE — 99285 EMERGENCY DEPT VISIT HI MDM: CPT

## 2018-06-05 PROCEDURE — 700105 HCHG RX REV CODE 258: Performed by: EMERGENCY MEDICINE

## 2018-06-05 PROCEDURE — 83690 ASSAY OF LIPASE: CPT

## 2018-06-05 PROCEDURE — 96375 TX/PRO/DX INJ NEW DRUG ADDON: CPT

## 2018-06-05 RX ORDER — ONDANSETRON 2 MG/ML
4 INJECTION INTRAMUSCULAR; INTRAVENOUS ONCE
Status: COMPLETED | OUTPATIENT
Start: 2018-06-05 | End: 2018-06-05

## 2018-06-05 RX ORDER — SODIUM CHLORIDE 9 MG/ML
1000 INJECTION, SOLUTION INTRAVENOUS ONCE
Status: COMPLETED | OUTPATIENT
Start: 2018-06-05 | End: 2018-06-05

## 2018-06-05 RX ADMIN — FENTANYL CITRATE 50 MCG: 50 INJECTION INTRAMUSCULAR; INTRAVENOUS at 22:14

## 2018-06-05 RX ADMIN — SODIUM CHLORIDE 1000 ML: 9 INJECTION, SOLUTION INTRAVENOUS at 22:15

## 2018-06-05 RX ADMIN — ONDANSETRON 4 MG: 2 INJECTION INTRAMUSCULAR; INTRAVENOUS at 22:14

## 2018-06-05 ASSESSMENT — PAIN SCALES - GENERAL: PAINLEVEL_OUTOF10: 8

## 2018-06-06 VITALS
TEMPERATURE: 96.9 F | SYSTOLIC BLOOD PRESSURE: 159 MMHG | RESPIRATION RATE: 18 BRPM | HEIGHT: 66 IN | DIASTOLIC BLOOD PRESSURE: 86 MMHG | HEART RATE: 92 BPM | OXYGEN SATURATION: 98 % | BODY MASS INDEX: 36.92 KG/M2 | WEIGHT: 229.72 LBS

## 2018-06-06 PROCEDURE — 74177 CT ABD & PELVIS W/CONTRAST: CPT

## 2018-06-06 PROCEDURE — 96375 TX/PRO/DX INJ NEW DRUG ADDON: CPT

## 2018-06-06 PROCEDURE — 700117 HCHG RX CONTRAST REV CODE 255: Performed by: EMERGENCY MEDICINE

## 2018-06-06 PROCEDURE — 700111 HCHG RX REV CODE 636 W/ 250 OVERRIDE (IP): Performed by: EMERGENCY MEDICINE

## 2018-06-06 RX ORDER — METRONIDAZOLE 500 MG/1
500 TABLET ORAL 3 TIMES DAILY
Qty: 42 TAB | Refills: 0 | Status: SHIPPED | OUTPATIENT
Start: 2018-06-06 | End: 2018-06-20

## 2018-06-06 RX ORDER — ONDANSETRON 4 MG/1
4 TABLET, ORALLY DISINTEGRATING ORAL EVERY 6 HOURS PRN
Qty: 10 TAB | Refills: 0 | Status: SHIPPED | OUTPATIENT
Start: 2018-06-05

## 2018-06-06 RX ORDER — HYDROMORPHONE HYDROCHLORIDE 2 MG/ML
0.5 INJECTION, SOLUTION INTRAMUSCULAR; INTRAVENOUS; SUBCUTANEOUS ONCE
Status: COMPLETED | OUTPATIENT
Start: 2018-06-06 | End: 2018-06-06

## 2018-06-06 RX ADMIN — IOHEXOL 100 ML: 350 INJECTION, SOLUTION INTRAVENOUS at 00:09

## 2018-06-06 RX ADMIN — HYDROMORPHONE HYDROCHLORIDE 0.5 MG: 2 INJECTION INTRAMUSCULAR; INTRAVENOUS; SUBCUTANEOUS at 00:19

## 2018-06-06 NOTE — DISCHARGE INSTRUCTIONS
Abdominal Pain, Adult  Abdominal pain can be caused by many things. Often, abdominal pain is not serious and it gets better with no treatment or by being treated at home. However, sometimes abdominal pain is serious. Your health care provider will do a medical history and a physical exam to try to determine the cause of your abdominal pain.  Follow these instructions at home:  · Take over-the-counter and prescription medicines only as told by your health care provider. Do not take a laxative unless told by your health care provider.  · Drink enough fluid to keep your urine clear or pale yellow.  · Watch your condition for any changes.  · Keep all follow-up visits as told by your health care provider. This is important.  Contact a health care provider if:  · Your abdominal pain changes or gets worse.  · You are not hungry or you lose weight without trying.  · You are constipated or have diarrhea for more than 2-3 days.  · You have pain when you urinate or have a bowel movement.  · Your abdominal pain wakes you up at night.  · Your pain gets worse with meals, after eating, or with certain foods.  · You are throwing up and cannot keep anything down.  · You have a fever.  Get help right away if:  · Your pain does not go away as soon as your health care provider told you to expect.  · You cannot stop throwing up.  · Your pain is only in areas of the abdomen, such as the right side or the left lower portion of the abdomen.  · You have bloody or black stools, or stools that look like tar.  · You have severe pain, cramping, or bloating in your abdomen.  · You have signs of dehydration, such as:  ¨ Dark urine, very little urine, or no urine.  ¨ Cracked lips.  ¨ Dry mouth.  ¨ Sunken eyes.  ¨ Sleepiness.  ¨ Weakness.  This information is not intended to replace advice given to you by your health care provider. Make sure you discuss any questions you have with your health care provider.  Document Released: 09/27/2006 Document  Revised: 07/07/2017 Document Reviewed: 05/31/2017  [employer] Patient Education © 2017 [employer]

## 2018-06-06 NOTE — ED NOTES
IV inserted, blood collected, sent. Pt medicated per MAR, NS infusing, pt updated on poc. She is resting waiting for CT.

## 2018-06-06 NOTE — ED NOTES
Pt verbalized understanding of discharge and follow up instructions. PIV removed. Pt given Rx.  VSS.  All questions answered, ambulates with steady gait to discharge.

## 2018-06-06 NOTE — ED TRIAGE NOTES
"Jaqueline Morocho  48 y.o. female  Chief Complaint   Patient presents with   • Hernia     Pt. states she thinks she ripped her right inguinal hernia more as it is protuding more than usual.   • Nausea/Vomiting/Diarrhea     Pt. states mary was recently dx with c-diff and that now pt. has had n/v/d for the past 2 days and she thinks she may have caught c-diff.     /86   Pulse 93   Temp 36.1 °C (96.9 °F)   Resp 18   Ht 1.676 m (5' 6\")   Wt 104.2 kg (229 lb 11.5 oz)   SpO2 96%   BMI 37.08 kg/m²     Pt amb to triage with steady gait for above complaint.   Pt is alert and oriented, speaking in full sentences, follows commands and responds appropriately to questions. NAD. Resp are even and unlabored.  Pt placed in lobby. Pt educated on triage process. Pt encouraged to alert staff for any changes.  "

## 2018-06-06 NOTE — ED PROVIDER NOTES
ED Provider Note    Scribed for Jimenez Rose M.D. by Maryana Elias. 6/5/2018, 9:53 PM.    Primary care provider: Jose Kirk D.N.P.  Means of arrival: Walk-in  History obtained from: Patient  History limited by: None    CHIEF COMPLAINT  Chief Complaint   Patient presents with   • Hernia     Pt. states she thinks she ripped her right inguinal hernia more as it is protuding more than usual.   • Nausea/Vomiting/Diarrhea     Pt. states mary was recently dx with c-diff and that now pt. has had n/v/d for the past 2 days and she thinks she may have caught c-diff.       HPI  Jaqueline Morocho is a 48 y.o. female who presents to the Emergency Department for nausea, vomiting, and diarrhea onset yesterday. The patient reports suddenly developing symptoms yesterday after exposure to her daughter who was diagnosed with C. diff 4 days ago. She believes she may have caught C. Diff and came to the ED for evaluation. She denies any blood in the stool. She reports taking antibiotics a month ago after being admitted for cellulitis to bilateral lower extremities. The patient also reports of developing an abdominal hernia recently after increased activity of taking care of her granddaughter. She reports history of abdominal hernias and had 11 surgical repairs. She has no history of diabetes.     REVIEW OF SYSTEMS  Pertinent positives include nausea, vomiting, diarrhea, and abdominal hernia. Pertinent negatives include no blood in stool.  All other systems reviewed and negative.  C.     PAST MEDICAL HISTORY   has a past medical history of Hypertension and Psychiatric disorder.    SURGICAL HISTORY   has a past surgical history that includes hernia repair; cholecystectomy; appendectomy; and tonsillectomy.    SOCIAL HISTORY  Social History   Substance Use Topics   • Smoking status: Never Smoker   • Smokeless tobacco: Never Used   • Alcohol use No      History   Drug Use No     FAMILY HISTORY  No family history noted    CURRENT  MEDICATIONS  No current facility-administered medications for this encounter.     Current Outpatient Prescriptions:   •  dicyclomine (BENTYL) 20 MG Tab, Take 1 Tab by mouth every 6 hours as needed (Abdominal pain/cramps)., Disp: 30 Tab, Rfl: 0  •  tramadol (ULTRAM) 50 MG Tab, Take 1-2 Tabs by mouth every 6 hours as needed (pain)., Disp: 14 Tab, Rfl: 0  •  omeprazole (PRILOSEC) 40 MG delayed-release capsule, Take 1 Cap by mouth every day., Disp: 30 Cap, Rfl: 0  •  hydrocodone-acetaminophen (NORCO) 5-325 MG Tab per tablet, Take 1-2 Tabs by mouth every four hours as needed (mild pain)., Disp: 10 Tab, Rfl: 0  •  ondansetron (ZOFRAN ODT) 4 MG TABLET DISPERSIBLE, Take 1 Tab by mouth every 8 hours as needed for Nausea/Vomiting., Disp: 10 Tab, Rfl: 0  •  guaifenesin-codeine (TUSSI-ORGANIDIN NR) 100-10 MG/5ML syrup, Take 10 mL by mouth 4 times a day as needed for Cough., Disp: 120 mL, Rfl: 0  •  citalopram (CELEXA) 40 MG Tab, Take 1 Tab by mouth every day., Disp: 30 Tab, Rfl: 0  •  lisinopril-hydrochlorothiazide (PRINZIDE, ZESTORETIC) 10-12.5 MG per tablet, Take 1 Tab by mouth every day., Disp: 30 Tab, Rfl: 0  •  MethylPREDNISolone (MEDROL DOSEPAK) 4 MG Tablet Therapy Pack, Use as directed, Disp: 1 Kit, Rfl: 0  •  citalopram (CELEXA) 40 MG Tab, Take 1 Tab by mouth every day., Disp: 30 Tab, Rfl: 0  •  atenolol (TENORMIN) 50 MG Tab, Take 1 Tab by mouth every day., Disp: 30 Tab, Rfl: 0  •  atenolol (TENORMIN) 50 MG Tab, Take 1 Tab by mouth every day., Disp: 30 Tab, Rfl: 0  •  lisinopril-hydrochlorothiazide (PRINZIDE, ZESTORETIC) 10-12.5 MG per tablet, Take 1 Tab by mouth every day., Disp: 30 Tab, Rfl: 0  •  ibuprofen (MOTRIN) 200 MG Tab, Take 200 mg by mouth every 6 hours as needed., Disp: , Rfl:     ALLERGIES  Allergies   Allergen Reactions   • Augmentin    • Bactrim [Sulfamethoxazole W-Trimethoprim]    • Cephalosporins    • Demerol    • Fentanyl    • Morphine    • Sulfa Drugs        PHYSICAL EXAM  VITAL SIGNS: /86    "Pulse 93   Temp 36.1 °C (96.9 °F)   Resp 18   Ht 1.676 m (5' 6\")   Wt 104.2 kg (229 lb 11.5 oz)   SpO2 96%   BMI 37.08 kg/m²     Constitutional: Well developed, Well nourished, No acute distress, Non-toxic appearance.   HENT: Normocephalic, Atraumatic, TMs normal, mucous membranes moist, no erythema, exudates, swelling, or masses, nares patent  Eyes: nonicteric  Neck: Supple, no meningismus  Lymphatic: No lymphadenopathy noted.   Cardiovascular: Regular rate and rhythm, no gallops rubs or murmurs  Lungs: Clear bilaterally   Abdomen: Bowel sounds normal, Soft, Tenderness at the ventral midline near scar, but difficult to palpate a definite hernia. No rebound or guarding.   Skin: Warm, Dry, no rash  Back: No tenderness, No CVA tenderness.   Genitalia: Deferred  Rectal: Deferred  Extremities: Erythema to bilateral lower extremities, slightly more prominent on right. Pulses 2+.   Neurologic: Alert, appropriate, follows commands, moving all extremities, normal speech, sensory motor intact.   Psychiatric: Affect normal    DIAGNOSTIC STUDIES / PROCEDURES    LABS  Results for orders placed or performed during the hospital encounter of 06/05/18   CBC WITH DIFFERENTIAL   Result Value Ref Range    WBC 6.2 4.8 - 10.8 K/uL    RBC 4.31 4.20 - 5.40 M/uL    Hemoglobin 13.7 12.0 - 16.0 g/dL    Hematocrit 42.6 37.0 - 47.0 %    MCV 98.8 (H) 81.4 - 97.8 fL    MCH 31.8 27.0 - 33.0 pg    MCHC 32.2 (L) 33.6 - 35.0 g/dL    RDW 49.0 35.9 - 50.0 fL    Platelet Count 247 164 - 446 K/uL    MPV 10.1 9.0 - 12.9 fL    Neutrophils-Polys 65.30 44.00 - 72.00 %    Lymphocytes 22.00 22.00 - 41.00 %    Monocytes 8.20 0.00 - 13.40 %    Eosinophils 3.20 0.00 - 6.90 %    Basophils 0.80 0.00 - 1.80 %    Immature Granulocytes 0.50 0.00 - 0.90 %    Nucleated RBC 0.00 /100 WBC    Neutrophils (Absolute) 4.08 2.00 - 7.15 K/uL    Lymphs (Absolute) 1.37 1.00 - 4.80 K/uL    Monos (Absolute) 0.51 0.00 - 0.85 K/uL    Eos (Absolute) 0.20 0.00 - 0.51 K/uL    " Baso (Absolute) 0.05 0.00 - 0.12 K/uL    Immature Granulocytes (abs) 0.03 0.00 - 0.11 K/uL    NRBC (Absolute) 0.00 K/uL   COMP METABOLIC PANEL   Result Value Ref Range    Sodium 136 135 - 145 mmol/L    Potassium 3.9 3.6 - 5.5 mmol/L    Chloride 104 96 - 112 mmol/L    Co2 23 20 - 33 mmol/L    Anion Gap 9.0 0.0 - 11.9    Glucose 101 (H) 65 - 99 mg/dL    Bun 16 8 - 22 mg/dL    Creatinine 0.72 0.50 - 1.40 mg/dL    Calcium 9.7 8.5 - 10.5 mg/dL    AST(SGOT) 100 (H) 12 - 45 U/L    ALT(SGPT) 130 (H) 2 - 50 U/L    Alkaline Phosphatase 181 (H) 30 - 99 U/L    Total Bilirubin 0.5 0.1 - 1.5 mg/dL    Albumin 4.5 3.2 - 4.9 g/dL    Total Protein 7.7 6.0 - 8.2 g/dL    Globulin 3.2 1.9 - 3.5 g/dL    A-G Ratio 1.4 g/dL   LIPASE   Result Value Ref Range    Lipase 35 11 - 82 U/L   HCG QUAL SERUM   Result Value Ref Range    Beta-Hcg Qualitative Serum Negative Negative   ESTIMATED GFR   Result Value Ref Range    GFR If African American >60 >60 mL/min/1.73 m 2    GFR If Non African American >60 >60 mL/min/1.73 m 2   All labs reviewed by me.    RADIOLOGY  LE VENOUS DUPLEX (Specify in Comments Left, Right Or Bilateral)   Final Result      CT-ABDOMEN-PELVIS WITH    (Results Pending)   The radiologist's interpretation of all radiological studies have been reviewed by me.    COURSE & MEDICAL DECISION MAKING  Nursing notes, VS, PMSFHx reviewed in chart.     9:53 PM Patient seen and examined at bedside. The patient presents with nausea, vomiting, diarrhea, and abdominal hernia and the differential diagnosis includes but is not limited to C diff, colitis, ventral hernia, DVT, cellulitis. Ordered for LE venous duplex, CT-abdomen, pelvis contrast, CBC, CMP, lipase, HCG qual serum, and C diff to evaluate. Patient was treated with fentanyl 50mcg and Zofran 4mg for her symptoms. She will receive IV fluids for vomiting and diarrhea.    11:08 PM Reviewed the patient's lab results, which shows elevated liver enzymes. No DVT noted.    Decision  Making:  This is a 48 y.o. year old female who presents with abdominal pain nausea vomiting and diarrhea. She has not had any vomiting or diarrhea since arrival in the emergency room here. She has a history of multiple abdominal surgeries related to primarily recurrent ventral hernias. She feels that she may be has another one and has mild tenderness just to the right of her midline incision. Patient also has what appears to be some chronic venous stasis change in her lower extremities-she was treated with antibiotics a month ago for her right lower extremity. There is some asymmetry in the right versus left upper extremity. Ultrasound of the area dentistry is no DVT. Patient's workup thus far includes a normal white count, with no shift. Patient has mild elevation of her transaminases with an AST of 100, ALT of 130 and ALP of 181.  Note she has previously had a cholecystectomy. Patient is currently pending CT to exclude incarcerated hernia -pending negative CT I feel the patient will likely be discharged. I am planning on treating her empirically with Flagyl given her close contacts with recent diagnosis of C. diff.    The patient will return for new or worsening symptoms and is stable at the time of discharge.    Patient has known hypertension that is being followed by her primary care provider.       DISPOSITION:  Patient will be discharged home in stable condition.    FOLLOW UP:  KATHLEEN Calvo.P.  [id number]  [place] [id number] [phone]    Schedule an appointment as soon as possible for a visit in 2 days  repeat abdominal exam in the emergency room in the next 24 hours for ongoing pain any fever or persistent vomiting      OUTPATIENT MEDICATIONS:  New Prescriptions    METRONIDAZOLE (FLAGYL) 500 MG TAB    Take 1 Tab by mouth 3 times a day for 14 days.    ONDANSETRON (ZOFRAN ODT) 4 MG TABLET DISPERSIBLE    Take 1 Tab by mouth every 6 hours as needed for Nausea.      FINAL  IMPRESSION  1. Generalized abdominal pain    2. Transaminitis    3. Diarrhea, unspecified type          I, Maryana Elias (Scribhumza), am scribing for, and in the presence of, Jimenez Rose M.D..    Electronically signed by: Maryana Elias (Scribe), 6/5/2018    IJimenez M.D. personally performed the services described in this documentation, as scribed by Maryana Elias in my presence, and it is both accurate and complete.    The note accurately reflects work and decisions made by me.  Jimenez Rose  6/6/2018  12:02 AM

## 2024-05-16 NOTE — ED NOTES
Patient appropriate for discharge per ERP. Discussed discharge instructions, home care, and follow up with patient. Patient verbalized understanding. No distress noted.   Pt being picked up by family member.    Started first trimester